# Patient Record
Sex: FEMALE | Race: WHITE | Employment: FULL TIME | URBAN - METROPOLITAN AREA
[De-identification: names, ages, dates, MRNs, and addresses within clinical notes are randomized per-mention and may not be internally consistent; named-entity substitution may affect disease eponyms.]

---

## 2017-01-02 ENCOUNTER — LAB ENCOUNTER (OUTPATIENT)
Dept: LAB | Age: 47
End: 2017-01-02
Attending: INTERNAL MEDICINE

## 2017-01-02 DIAGNOSIS — Z00.00 ROUTINE PHYSICAL EXAMINATION: ICD-10-CM

## 2017-01-02 LAB
ALBUMIN SERPL-MCNC: 3.4 G/DL (ref 3.5–4.8)
ALP LIVER SERPL-CCNC: 56 U/L (ref 37–98)
ALT SERPL-CCNC: 33 U/L (ref 14–54)
AST SERPL-CCNC: 19 U/L (ref 15–41)
BASOPHILS # BLD AUTO: 0.03 X10(3) UL (ref 0–0.1)
BASOPHILS NFR BLD AUTO: 0.4 %
BILIRUB SERPL-MCNC: 0.3 MG/DL (ref 0.1–2)
BUN BLD-MCNC: 13 MG/DL (ref 8–20)
CALCIUM BLD-MCNC: 8.4 MG/DL (ref 8.3–10.3)
CHLORIDE: 108 MMOL/L (ref 101–111)
CHOLEST SMN-MCNC: 179 MG/DL (ref ?–200)
CO2: 27 MMOL/L (ref 22–32)
CREAT BLD-MCNC: 0.77 MG/DL (ref 0.55–1.02)
EOSINOPHIL # BLD AUTO: 0.34 X10(3) UL (ref 0–0.3)
EOSINOPHIL NFR BLD AUTO: 4.8 %
ERYTHROCYTE [DISTWIDTH] IN BLOOD BY AUTOMATED COUNT: 12.5 % (ref 11.5–16)
GLUCOSE BLD-MCNC: 83 MG/DL (ref 70–99)
HCT VFR BLD AUTO: 40.7 % (ref 34–50)
HDLC SERPL-MCNC: 48 MG/DL (ref 45–?)
HDLC SERPL: 3.73 {RATIO} (ref ?–4.44)
HGB BLD-MCNC: 13.3 G/DL (ref 12–16)
IMMATURE GRANULOCYTE COUNT: 0.02 X10(3) UL (ref 0–1)
IMMATURE GRANULOCYTE RATIO %: 0.3 %
LDLC SERPL CALC-MCNC: 110 MG/DL (ref ?–130)
LYMPHOCYTES # BLD AUTO: 2.31 X10(3) UL (ref 0.9–4)
LYMPHOCYTES NFR BLD AUTO: 32.5 %
M PROTEIN MFR SERPL ELPH: 6.7 G/DL (ref 6.1–8.3)
MCH RBC QN AUTO: 29.6 PG (ref 27–33.2)
MCHC RBC AUTO-ENTMCNC: 32.7 G/DL (ref 31–37)
MCV RBC AUTO: 90.4 FL (ref 81–100)
MONOCYTES # BLD AUTO: 0.7 X10(3) UL (ref 0.1–0.6)
MONOCYTES NFR BLD AUTO: 9.8 %
NEUTROPHIL ABS PRELIM: 3.71 X10 (3) UL (ref 1.3–6.7)
NEUTROPHILS # BLD AUTO: 3.71 X10(3) UL (ref 1.3–6.7)
NEUTROPHILS NFR BLD AUTO: 52.2 %
NONHDLC SERPL-MCNC: 131 MG/DL (ref ?–130)
PLATELET # BLD AUTO: 281 10(3)UL (ref 150–450)
POTASSIUM SERPL-SCNC: 4 MMOL/L (ref 3.6–5.1)
RBC # BLD AUTO: 4.5 X10(6)UL (ref 3.8–5.1)
RED CELL DISTRIBUTION WIDTH-SD: 41.6 FL (ref 35.1–46.3)
SODIUM SERPL-SCNC: 141 MMOL/L (ref 136–144)
TRIGLYCERIDES: 105 MG/DL (ref ?–150)
TSI SER-ACNC: 2.99 MIU/ML (ref 0.35–5.5)
VLDL: 21 MG/DL (ref 5–40)
WBC # BLD AUTO: 7.1 X10(3) UL (ref 4–13)

## 2017-01-02 PROCEDURE — 80061 LIPID PANEL: CPT

## 2017-01-02 PROCEDURE — 36415 COLL VENOUS BLD VENIPUNCTURE: CPT

## 2017-01-02 PROCEDURE — 84443 ASSAY THYROID STIM HORMONE: CPT

## 2017-01-02 PROCEDURE — 85025 COMPLETE CBC W/AUTO DIFF WBC: CPT

## 2017-01-02 PROCEDURE — 80053 COMPREHEN METABOLIC PANEL: CPT

## 2017-01-23 ENCOUNTER — OFFICE VISIT (OUTPATIENT)
Dept: INTERNAL MEDICINE CLINIC | Facility: CLINIC | Age: 47
End: 2017-01-23

## 2017-01-23 VITALS
DIASTOLIC BLOOD PRESSURE: 80 MMHG | BODY MASS INDEX: 28 KG/M2 | WEIGHT: 139 LBS | SYSTOLIC BLOOD PRESSURE: 110 MMHG | HEART RATE: 81 BPM | RESPIRATION RATE: 12 BRPM | OXYGEN SATURATION: 100 %

## 2017-01-23 DIAGNOSIS — H10.31 ACUTE CONJUNCTIVITIS OF RIGHT EYE, UNSPECIFIED ACUTE CONJUNCTIVITIS TYPE: ICD-10-CM

## 2017-01-23 DIAGNOSIS — R05.9 COUGH: Primary | ICD-10-CM

## 2017-01-23 PROCEDURE — 99213 OFFICE O/P EST LOW 20 MIN: CPT | Performed by: INTERNAL MEDICINE

## 2017-01-23 RX ORDER — ALBUTEROL SULFATE 90 UG/1
2 AEROSOL, METERED RESPIRATORY (INHALATION) EVERY 6 HOURS PRN
Qty: 1 INHALER | Refills: 0 | Status: SHIPPED | OUTPATIENT
Start: 2017-01-23 | End: 2017-05-03 | Stop reason: ALTCHOICE

## 2017-01-23 RX ORDER — HYDROCODONE BITARTRATE AND HOMATROPINE METHYLBROMIDE ORAL SOLUTION 5; 1.5 MG/5ML; MG/5ML
5 LIQUID ORAL EVERY 6 HOURS PRN
Qty: 240 ML | Refills: 0 | Status: SHIPPED | OUTPATIENT
Start: 2017-01-23 | End: 2017-05-03 | Stop reason: ALTCHOICE

## 2017-01-23 NOTE — PROGRESS NOTES
Trip Schultz is a 42year old female.   HPI:   CC:cold and sinus/sore throat for 1 week  Woke up today with drainage in right eye whitish  Hx allergies  Cough so hard at times    ALL:  Dicloxacillin             Pcns [Penicillins]          Current Outpatie supple  LYMPH: no cervical adenopathy,  CARDIO: AEOR1L4 no S3S4 no murmur,   LUNGS: clear to auscultation,occasional wheeze with forced exhalation      ASSESSMENT AND PLAN:   Cough  (primary encounter diagnosis)-hydromet prn, if not controlled, can use pro

## 2017-05-03 ENCOUNTER — OFFICE VISIT (OUTPATIENT)
Dept: INTERNAL MEDICINE CLINIC | Facility: CLINIC | Age: 47
End: 2017-05-03

## 2017-05-03 VITALS
DIASTOLIC BLOOD PRESSURE: 70 MMHG | HEIGHT: 59 IN | BODY MASS INDEX: 27.62 KG/M2 | HEART RATE: 78 BPM | WEIGHT: 137 LBS | OXYGEN SATURATION: 98 % | TEMPERATURE: 98 F | RESPIRATION RATE: 12 BRPM | SYSTOLIC BLOOD PRESSURE: 90 MMHG

## 2017-05-03 DIAGNOSIS — R21 RASH: Primary | ICD-10-CM

## 2017-05-03 DIAGNOSIS — L30.9 ECZEMA, UNSPECIFIED TYPE: ICD-10-CM

## 2017-05-03 PROCEDURE — 99213 OFFICE O/P EST LOW 20 MIN: CPT | Performed by: INTERNAL MEDICINE

## 2017-05-03 RX ORDER — LEVOFLOXACIN 500 MG/1
500 TABLET, FILM COATED ORAL DAILY
Qty: 7 TABLET | Refills: 0 | Status: SHIPPED | OUTPATIENT
Start: 2017-05-03 | End: 2017-07-07

## 2017-05-03 NOTE — PROGRESS NOTES
Steph Valdez is a 42year old female.   HPI:   CC: dry skin /rash started Thursday  Used essential oils  Itchy+  Hx eczema  Pt uses triamcinolone  No pets at home  Not in garden  No hot tub  Used warm bath oatmeal yesterday  Not at the gym  Stress +  Used dizziness  Musculoskeletal: no joint pain  Extremity: no swelling, no numbness/tingling  Endocrine: no sweating    EXAM:   BP 90/70 mmHg  Pulse 78  Temp(Src) 98.1 °F (36.7 °C) (Oral)  Resp 12  Ht 59\"  Wt 137 lb  BMI 27.66 kg/m2  SpO2 98%  GENERAL: well de

## 2017-07-07 ENCOUNTER — OFFICE VISIT (OUTPATIENT)
Dept: OBGYN CLINIC | Facility: CLINIC | Age: 47
End: 2017-07-07

## 2017-07-07 VITALS
BODY MASS INDEX: 28.43 KG/M2 | HEART RATE: 80 BPM | HEIGHT: 59 IN | DIASTOLIC BLOOD PRESSURE: 62 MMHG | RESPIRATION RATE: 16 BRPM | WEIGHT: 141 LBS | SYSTOLIC BLOOD PRESSURE: 112 MMHG

## 2017-07-07 DIAGNOSIS — Z12.31 VISIT FOR SCREENING MAMMOGRAM: ICD-10-CM

## 2017-07-07 DIAGNOSIS — Z01.419 WELL WOMAN EXAM WITH ROUTINE GYNECOLOGICAL EXAM: Primary | ICD-10-CM

## 2017-07-07 PROBLEM — Z30.41 ENCOUNTER FOR SURVEILLANCE OF CONTRACEPTIVE PILLS: Status: ACTIVE | Noted: 2017-07-07

## 2017-07-07 PROCEDURE — 87624 HPV HI-RISK TYP POOLED RSLT: CPT | Performed by: OBSTETRICS & GYNECOLOGY

## 2017-07-07 PROCEDURE — 99396 PREV VISIT EST AGE 40-64: CPT | Performed by: OBSTETRICS & GYNECOLOGY

## 2017-07-07 PROCEDURE — 88175 CYTOPATH C/V AUTO FLUID REDO: CPT | Performed by: OBSTETRICS & GYNECOLOGY

## 2017-07-07 RX ORDER — NORETHINDRONE ACETATE AND ETHINYL ESTRADIOL AND FERROUS FUMARATE 1MG-20(24)
1 KIT ORAL DAILY
Qty: 28 TABLET | Refills: 0 | Status: SHIPPED | OUTPATIENT
Start: 2017-07-07 | End: 2017-07-07

## 2017-07-07 RX ORDER — NORETHINDRONE ACETATE AND ETHINYL ESTRADIOL AND FERROUS FUMARATE 1MG-20(24)
1 KIT ORAL DAILY
Qty: 28 TABLET | Refills: 11 | Status: SHIPPED | OUTPATIENT
Start: 2017-07-07 | End: 2017-07-16

## 2017-07-07 NOTE — PROGRESS NOTES
Magdaleno Reynolds is a 42year old female  No LMP recorded. Patient is not currently having periods (Reason: Continuous Pill). Patient presents with:  Wellness Visit: annual and refill for [facility]. Oneal Claude    No problems with the birth control pill wishes to stay on Comment: 1-2 drinks per month     Drug use: No    Sexual activity: Not on file     Other Topics Concern    Caffeine Concern Yes    Comment: coffee, tea    Exercise Yes    Comment: 5 days per week      Social History Narrative   None on file       FAMILY HI and situation.  Appropriate mood and affect    Pelvic Exam:  External Genitalia: normal appearance, hair distribution, and no lesions  Urethral Meatus:  normal in size, location, without lesions and prolapse  Bladder:  No fullness, masses or tenderness  Vag

## 2017-07-10 ENCOUNTER — MED REC SCAN ONLY (OUTPATIENT)
Dept: OBGYN CLINIC | Facility: CLINIC | Age: 47
End: 2017-07-10

## 2017-07-10 LAB — HPV I/H RISK 1 DNA SPEC QL NAA+PROBE: NEGATIVE

## 2017-07-10 RX ORDER — SIMVASTATIN 20 MG
TABLET ORAL
Qty: 30 TABLET | Refills: 5 | Status: SHIPPED | OUTPATIENT
Start: 2017-07-10 | End: 2017-12-19

## 2017-07-16 DIAGNOSIS — Z01.419 WELL WOMAN EXAM WITH ROUTINE GYNECOLOGICAL EXAM: ICD-10-CM

## 2017-07-17 RX ORDER — NORETHINDRONE ACETATE AND ETHINYL ESTRADIOL AND FERROUS FUMARATE 1MG-20(24)
KIT ORAL
Qty: 84 TABLET | Refills: 3 | Status: SHIPPED | OUTPATIENT
Start: 2017-07-17 | End: 2018-07-11

## 2017-08-10 ENCOUNTER — OFFICE VISIT (OUTPATIENT)
Dept: FAMILY MEDICINE CLINIC | Facility: CLINIC | Age: 47
End: 2017-08-10

## 2017-08-10 VITALS
SYSTOLIC BLOOD PRESSURE: 116 MMHG | OXYGEN SATURATION: 97 % | TEMPERATURE: 98 F | WEIGHT: 138 LBS | BODY MASS INDEX: 27.82 KG/M2 | HEIGHT: 59 IN | RESPIRATION RATE: 16 BRPM | DIASTOLIC BLOOD PRESSURE: 70 MMHG | HEART RATE: 88 BPM

## 2017-08-10 DIAGNOSIS — L24.7 CONTACT DERMATITIS AND ECZEMA DUE TO PLANT: Primary | ICD-10-CM

## 2017-08-10 PROCEDURE — 99213 OFFICE O/P EST LOW 20 MIN: CPT | Performed by: NURSE PRACTITIONER

## 2017-08-10 RX ORDER — METHYLPREDNISOLONE 4 MG/1
TABLET ORAL
Qty: 1 KIT | Refills: 0 | Status: SHIPPED | OUTPATIENT
Start: 2017-08-10 | End: 2017-11-17

## 2017-08-10 NOTE — PROGRESS NOTES
HPI:    Patient ID: Nehemiah Zarate is a 42year old female. Rash   This is a new problem. The current episode started in the past 7 days. The problem has been gradually worsening since onset.  The affected locations include the left hand and right hand ( Urticarial vesicular rash clustered around dorsum of hand bilaterally from base of thumb extending to wrist. Old patch from eczema noted to left hand. No cellulitis appreciated. Psychiatric: She has a normal mood and affect.               ASSESSMENT/PLAN

## 2017-08-10 NOTE — PATIENT INSTRUCTIONS
Contact Dermatitis  Contact dermatitis is a skin rash caused by something that touches the skin and makes it irritated and inflamed. Your skin may be red, swollen, dry, and may be cracked. Blisters may form and ooze. The rash will itch.   Contact dermatit · You can also try wet dressings. One way to do this is to wear a wet piece of clothing under a dry one. Wear a damp shirt under a dry shirt if your upper body is affected. This can relieve itching and prevent you from scratching the affected area.   · You © 2040-4093 [address], [address] [place]. All rights reserved. This information is not intended as a substitute for professional medical care. Always follow your healthcare professional's instructions.

## 2017-11-17 ENCOUNTER — HOSPITAL ENCOUNTER (OUTPATIENT)
Age: 47
Discharge: HOME | End: 2017-11-17
Attending: FAMILY MEDICINE
Payer: COMMERCIAL

## 2017-11-17 VITALS
BODY MASS INDEX: 28.83 KG/M2 | DIASTOLIC BLOOD PRESSURE: 91 MMHG | RESPIRATION RATE: 18 BRPM | OXYGEN SATURATION: 99 % | TEMPERATURE: 98 F | HEIGHT: 59 IN | HEART RATE: 88 BPM | SYSTOLIC BLOOD PRESSURE: 131 MMHG | WEIGHT: 143 LBS

## 2017-11-17 DIAGNOSIS — K64.9 HEMORRHOIDS, UNSPECIFIED HEMORRHOID TYPE: Primary | ICD-10-CM

## 2017-11-17 PROCEDURE — 99202 OFFICE O/P NEW SF 15 MIN: CPT

## 2017-11-17 PROCEDURE — 82272 OCCULT BLD FECES 1-3 TESTS: CPT

## 2017-11-17 NOTE — ED PROVIDER NOTES
Patient Seen in: [address]    History   Patient presents with:  Bleeding (hematologic)    Stated Complaint: bloody stool    HPI    Pt is a 40 yo who presents with 3 episodes of bright red blood after hard stools.  She no (36.7 °C) (Oral)   Resp 18   Ht 149.9 cm (4' 11\")   Wt 64.9 kg   SpO2 99%   BMI 28.88 kg/m²         Physical Exam   Constitutional: She is oriented to person, place, and time. She appears well-developed and well-nourished. Abdominal: Soft.  Bowel sounds

## 2017-11-17 NOTE — ED INITIAL ASSESSMENT (HPI)
Moderate blood in stool x3, last time was this am; patient denies constipation but states that she had a little difficulty having a BM the first time she noticed blood in the stool on Tuesday; h/o anal fissure in 2002; denies fever, NVD

## 2017-12-19 ENCOUNTER — OFFICE VISIT (OUTPATIENT)
Dept: INTERNAL MEDICINE CLINIC | Facility: CLINIC | Age: 47
End: 2017-12-19

## 2017-12-19 VITALS
WEIGHT: 149 LBS | DIASTOLIC BLOOD PRESSURE: 70 MMHG | RESPIRATION RATE: 16 BRPM | BODY MASS INDEX: 30.04 KG/M2 | HEIGHT: 59 IN | OXYGEN SATURATION: 98 % | SYSTOLIC BLOOD PRESSURE: 110 MMHG | HEART RATE: 97 BPM

## 2017-12-19 DIAGNOSIS — Z00.00 ROUTINE PHYSICAL EXAMINATION: Primary | ICD-10-CM

## 2017-12-19 PROCEDURE — 99396 PREV VISIT EST AGE 40-64: CPT | Performed by: INTERNAL MEDICINE

## 2017-12-19 RX ORDER — SIMVASTATIN 20 MG
TABLET ORAL
Qty: 30 TABLET | Refills: 11 | Status: SHIPPED | OUTPATIENT
Start: 2017-12-19 | End: 2018-11-16

## 2017-12-19 NOTE — PROGRESS NOTES
HPI:   Jaylin Wisdom is a 41year old female who presents for a complete physical exam.  Pt went to urgent care and had hemorrhoid    Wt Readings from Last 6 Encounters:  12/19/17 : 149 lb  11/17/17 : 143 lb  08/10/17 : 138 lb  07/07/17 : 141 lb  05/03/17 negative   • History of biopsy 06/22/2012    vulva biopsy - negative for dysplasia,viral effect, and malignancy   • Human papilloma virus infection 06/29/2015   • Hypercholesterolemia    • Hyperlipidemia    • Multiple allergies    • Pap smear for cervical denies hx of allergy or asthma    EXAM:   /70 (BP Location: Left arm, Patient Position: Sitting, Cuff Size: adult)   Pulse 97   Resp 16   Ht 59\"   Wt 149 lb   SpO2 98%   BMI 30.09 kg/m²   Body mass index is 30.09 kg/m².     GENERAL: well developed, w

## 2018-01-03 ENCOUNTER — TELEPHONE (OUTPATIENT)
Dept: OBGYN CLINIC | Facility: CLINIC | Age: 48
End: 2018-01-03

## 2018-01-03 RX ORDER — NORETHINDRONE ACETATE AND ETHINYL ESTRADIOL AND FERROUS FUMARATE 1MG-20(24)
1 KIT ORAL DAILY
Qty: 28 TABLET | Refills: 6 | Status: SHIPPED | OUTPATIENT
Start: 2018-01-03 | End: 2018-01-31 | Stop reason: WASHOUT

## 2018-01-03 NOTE — TELEPHONE ENCOUNTER
Script for [employer] not being covered by ins d/t new year. Pharm will contact pt to see if ok to give generic.

## 2018-01-25 ENCOUNTER — LAB ENCOUNTER (OUTPATIENT)
Dept: LAB | Age: 48
End: 2018-01-25
Attending: INTERNAL MEDICINE
Payer: COMMERCIAL

## 2018-01-25 DIAGNOSIS — Z00.00 ROUTINE PHYSICAL EXAMINATION: ICD-10-CM

## 2018-01-25 LAB
ALBUMIN SERPL-MCNC: 3.6 G/DL (ref 3.5–4.8)
ALP LIVER SERPL-CCNC: 52 U/L (ref 37–98)
ALT SERPL-CCNC: 39 U/L (ref 14–54)
AST SERPL-CCNC: 19 U/L (ref 15–41)
BASOPHILS # BLD AUTO: 0.02 X10(3) UL (ref 0–0.1)
BASOPHILS NFR BLD AUTO: 0.4 %
BILIRUB SERPL-MCNC: 0.4 MG/DL (ref 0.1–2)
BUN BLD-MCNC: 14 MG/DL (ref 8–20)
CALCIUM BLD-MCNC: 8.5 MG/DL (ref 8.3–10.3)
CHLORIDE: 109 MMOL/L (ref 101–111)
CHOLEST SMN-MCNC: 134 MG/DL (ref ?–200)
CO2: 25 MMOL/L (ref 22–32)
CREAT BLD-MCNC: 0.82 MG/DL (ref 0.55–1.02)
EOSINOPHIL # BLD AUTO: 0.15 X10(3) UL (ref 0–0.3)
EOSINOPHIL NFR BLD AUTO: 3 %
ERYTHROCYTE [DISTWIDTH] IN BLOOD BY AUTOMATED COUNT: 13.1 % (ref 11.5–16)
FREE T4: 1.3 NG/DL (ref 0.9–1.8)
GLUCOSE BLD-MCNC: 86 MG/DL (ref 70–99)
HCT VFR BLD AUTO: 41.3 % (ref 34–50)
HDLC SERPL-MCNC: 48 MG/DL (ref 45–?)
HDLC SERPL: 2.79 {RATIO} (ref ?–4.44)
HGB BLD-MCNC: 13.5 G/DL (ref 12–16)
IMMATURE GRANULOCYTE COUNT: 0.02 X10(3) UL (ref 0–1)
IMMATURE GRANULOCYTE RATIO %: 0.4 %
LDLC SERPL CALC-MCNC: 72 MG/DL (ref ?–130)
LYMPHOCYTES # BLD AUTO: 1.37 X10(3) UL (ref 0.9–4)
LYMPHOCYTES NFR BLD AUTO: 27.5 %
M PROTEIN MFR SERPL ELPH: 7.4 G/DL (ref 6.1–8.3)
MCH RBC QN AUTO: 29.6 PG (ref 27–33.2)
MCHC RBC AUTO-ENTMCNC: 32.7 G/DL (ref 31–37)
MCV RBC AUTO: 90.6 FL (ref 81–100)
MONOCYTES # BLD AUTO: 0.57 X10(3) UL (ref 0.1–0.6)
MONOCYTES NFR BLD AUTO: 11.4 %
NEUTROPHIL ABS PRELIM: 2.86 X10 (3) UL (ref 1.3–6.7)
NEUTROPHILS # BLD AUTO: 2.86 X10(3) UL (ref 1.3–6.7)
NEUTROPHILS NFR BLD AUTO: 57.3 %
NONHDLC SERPL-MCNC: 86 MG/DL (ref ?–130)
PLATELET # BLD AUTO: 217 10(3)UL (ref 150–450)
POTASSIUM SERPL-SCNC: 4.2 MMOL/L (ref 3.6–5.1)
RBC # BLD AUTO: 4.56 X10(6)UL (ref 3.8–5.1)
RED CELL DISTRIBUTION WIDTH-SD: 42.6 FL (ref 35.1–46.3)
SODIUM SERPL-SCNC: 141 MMOL/L (ref 136–144)
TRIGL SERPL-MCNC: 68 MG/DL (ref ?–150)
TSI SER-ACNC: 1.74 MIU/ML (ref 0.35–5.5)
VLDLC SERPL CALC-MCNC: 14 MG/DL (ref 5–40)
WBC # BLD AUTO: 5 X10(3) UL (ref 4–13)

## 2018-01-25 PROCEDURE — 80061 LIPID PANEL: CPT | Performed by: INTERNAL MEDICINE

## 2018-01-25 PROCEDURE — 80050 GENERAL HEALTH PANEL: CPT | Performed by: INTERNAL MEDICINE

## 2018-01-25 PROCEDURE — 36415 COLL VENOUS BLD VENIPUNCTURE: CPT | Performed by: INTERNAL MEDICINE

## 2018-01-25 PROCEDURE — 84439 ASSAY OF FREE THYROXINE: CPT | Performed by: INTERNAL MEDICINE

## 2018-02-03 ENCOUNTER — TELEPHONE (OUTPATIENT)
Dept: INTERNAL MEDICINE CLINIC | Facility: CLINIC | Age: 48
End: 2018-02-03

## 2018-02-03 NOTE — TELEPHONE ENCOUNTER
Incoming (mail or fax):  fax  Received from:  [facility]  Documentation given to:  triage

## 2018-02-05 ENCOUNTER — TELEPHONE (OUTPATIENT)
Dept: INTERNAL MEDICINE CLINIC | Facility: CLINIC | Age: 48
End: 2018-02-05

## 2018-02-05 NOTE — TELEPHONE ENCOUNTER
Received fax from [facility] re: Right breast mammogram Additional Views and US done 2/2/18.   Impression: the questioned asymmetry in Right breast is thought to represent superimposition of normal tissue; recommend screening 1 year, as long as clinical exam mathew

## 2018-02-05 NOTE — TELEPHONE ENCOUNTER
Received fax from [facility] re: 2D/3D mammogram done 1/27/18. Impression: Irregular asymmetry in Right breast is indeterminate. Additional views with possible US recommended. See imaging 2/1/18 Final.    To consult folder.

## 2018-02-05 NOTE — TELEPHONE ENCOUNTER
Incoming (mail or fax):  fax  Received from:  [facility]  Documentation given to:  triage

## 2018-02-12 ENCOUNTER — MED REC SCAN ONLY (OUTPATIENT)
Dept: OBGYN CLINIC | Facility: CLINIC | Age: 48
End: 2018-02-12

## 2018-06-11 RX ORDER — TRIAMCINOLONE ACETONIDE 1 MG/G
CREAM TOPICAL 2 TIMES DAILY PRN
Qty: 45 G | Refills: 0 | Status: SHIPPED | OUTPATIENT
Start: 2018-06-11 | End: 2018-11-16

## 2018-06-11 NOTE — TELEPHONE ENCOUNTER
Last OV relevant to medication: 12/19/17  Last refill date: 12/19/17     #/refills: 45g/0  When pt was asked to return for OV: 1 yr  Upcoming appt/reason: none

## 2018-07-05 ENCOUNTER — TELEPHONE (OUTPATIENT)
Dept: OBGYN CLINIC | Facility: CLINIC | Age: 48
End: 2018-07-05

## 2018-07-05 NOTE — TELEPHONE ENCOUNTER
Per [facility] pt is active with a [facility] ppo plus plan medical coverage with an effective date of 07-. The group # is [id number] and id # [id number]. The reference # for this call is [id number].  Thanks

## 2018-07-11 ENCOUNTER — OFFICE VISIT (OUTPATIENT)
Dept: OBGYN CLINIC | Facility: CLINIC | Age: 48
End: 2018-07-11

## 2018-07-11 VITALS
HEIGHT: 59 IN | SYSTOLIC BLOOD PRESSURE: 104 MMHG | HEART RATE: 64 BPM | BODY MASS INDEX: 28.22 KG/M2 | DIASTOLIC BLOOD PRESSURE: 60 MMHG | WEIGHT: 140 LBS | RESPIRATION RATE: 16 BRPM

## 2018-07-11 DIAGNOSIS — Z01.419 WELL WOMAN EXAM WITH ROUTINE GYNECOLOGICAL EXAM: Primary | ICD-10-CM

## 2018-07-11 DIAGNOSIS — Z30.41 ENCOUNTER FOR SURVEILLANCE OF CONTRACEPTIVE PILLS: ICD-10-CM

## 2018-07-11 PROBLEM — N39.41 URGENCY INCONTINENCE: Status: ACTIVE | Noted: 2018-07-11

## 2018-07-11 PROCEDURE — 99396 PREV VISIT EST AGE 40-64: CPT | Performed by: OBSTETRICS & GYNECOLOGY

## 2018-07-11 RX ORDER — NORETHINDRONE ACETATE AND ETHINYL ESTRADIOL AND FERROUS FUMARATE 1MG-20(24)
1 KIT ORAL DAILY
Qty: 28 TABLET | Refills: 11 | Status: SHIPPED | OUTPATIENT
Start: 2018-07-11 | End: 2018-08-08 | Stop reason: WASHOUT

## 2018-07-11 RX ORDER — TOLTERODINE TARTRATE 2 MG/1
2 TABLET, EXTENDED RELEASE ORAL DAILY
Qty: 30 TABLET | Refills: 11 | Status: SHIPPED | OUTPATIENT
Start: 2018-07-11 | End: 2018-12-27 | Stop reason: ALTCHOICE

## 2018-07-11 NOTE — PROGRESS NOTES
Juma Lockhart is a 41year old female  No LMP recorded (approximate). Patient is not currently having periods (Reason: Continuous Pill).  Patient presents with:  Wellness Visit: annual  .     She does not have periods on the birth control pills stay HISTORY:    Social History  Social History   Marital status:   Spouse name: N/A    Years of education: N/A  Number of children: N/A     Occupational History  None on file     Social History Main Topics   Smoking status: Never Smoker    Smokeless tob nodules, no adenopathy  Breast: normal without palpable masses, tenderness, asymmetry, nipple discharge, nipple retraction or skin changes  Abdomen:  soft, nontender, nondistended, no masses  Skin/Hair: no unusual rashes or bruises   Extremities: no edema,

## 2018-08-12 ENCOUNTER — OFFICE VISIT (OUTPATIENT)
Dept: FAMILY MEDICINE CLINIC | Facility: CLINIC | Age: 48
End: 2018-08-12
Payer: COMMERCIAL

## 2018-08-12 VITALS
WEIGHT: 139.38 LBS | SYSTOLIC BLOOD PRESSURE: 112 MMHG | BODY MASS INDEX: 28 KG/M2 | OXYGEN SATURATION: 98 % | DIASTOLIC BLOOD PRESSURE: 68 MMHG | TEMPERATURE: 99 F | HEART RATE: 76 BPM | RESPIRATION RATE: 20 BRPM

## 2018-08-12 DIAGNOSIS — L30.9 ECZEMA OF BOTH HANDS: Primary | ICD-10-CM

## 2018-08-12 PROCEDURE — 99213 OFFICE O/P EST LOW 20 MIN: CPT | Performed by: NURSE PRACTITIONER

## 2018-08-12 RX ORDER — METHYLPREDNISOLONE 4 MG/1
TABLET ORAL
Qty: 1 KIT | Refills: 0 | Status: SHIPPED | OUTPATIENT
Start: 2018-08-12 | End: 2018-12-27 | Stop reason: ALTCHOICE

## 2018-08-12 NOTE — PROGRESS NOTES
CC: Rash. HPI:  This is a rash problem. The current episode started in the past 3 days. The problem has been worsening since onset. The affected locations include bilateral hands and wrists   .  The rash is characterized by pruritic erythematous shon History   Problem Relation Age of Onset   • osteopenia [Other] [OTHER] Mother    • tobacco [Other] [OTHER] Brother    • cabg [Other] [OTHER] Brother    • Heart Attack Father 45   • tobacco [Other] [OTHER] Father    • Heart Disease Father    • Heart Disorde 57year old female who presents with:    Eczema of both hands  (primary encounter diagnosis)     Will trial Medrol Dose Pack. She is to f/u with Dr. Brandie Estrada or see dermatology.     Meds & Refills for this Visit:  Signed Prescriptions Disp Refills    methylPREDN

## 2018-08-17 DIAGNOSIS — Z01.419 WELL WOMAN EXAM WITH ROUTINE GYNECOLOGICAL EXAM: ICD-10-CM

## 2018-08-17 RX ORDER — NORETHINDRONE ACETATE AND ETHINYL ESTRADIOL AND FERROUS FUMARATE 1MG-20(24)
KIT ORAL
Qty: 84 TABLET | Refills: 3 | Status: SHIPPED | OUTPATIENT
Start: 2018-08-17 | End: 2019-07-15

## 2018-11-12 NOTE — TELEPHONE ENCOUNTER
Left messages for Patient to call back to schedule PE appt with Dr. Sumit Multani to receive Refill or if patient has moved on to New PCP to inform the office as well.     Last OV relevant to medication: 12/19/2017 - PE  Last refill date: 6/11/18    #/refills: 4

## 2018-11-14 NOTE — TELEPHONE ENCOUNTER
[facility] 8:28 AM               Patient scheduled a physical with Dr Tami Cuadra for 12/26/18.                Brad Iniguezigned 11/12/2018               Left messages for Patient to call back to schedule PE appt with Dr. Tami Cuadra to

## 2018-11-16 ENCOUNTER — PATIENT MESSAGE (OUTPATIENT)
Dept: INTERNAL MEDICINE CLINIC | Facility: CLINIC | Age: 48
End: 2018-11-16

## 2018-11-16 RX ORDER — SIMVASTATIN 20 MG
TABLET ORAL
Qty: 30 TABLET | Refills: 0 | Status: SHIPPED | OUTPATIENT
Start: 2018-11-16 | End: 2019-01-08

## 2018-11-16 NOTE — TELEPHONE ENCOUNTER
From: Priscilla Bernard  To: Greyson Sagastume MD  Sent: 2018 11:34 AM CST  Subject: Prescription Question    I have an  prescription for TRIAMCINOLONE ACETONIDE 0.1 % External Cream [Pharmacy Med Name: TRIAMCINOLONE 0.1% CREAM]  I requested the ph

## 2018-11-16 NOTE — TELEPHONE ENCOUNTER
Will only refill once. Please, advice to the pt that this is not long term medication and no more refills will be given until I see and evaluate her.

## 2018-11-16 NOTE — TELEPHONE ENCOUNTER
Pt sent a mychart checking on status of RX. She uses cream for eczema. She did make OV for CPX on 12/26/18. Please advise.

## 2018-12-27 ENCOUNTER — OFFICE VISIT (OUTPATIENT)
Dept: INTERNAL MEDICINE CLINIC | Facility: CLINIC | Age: 48
End: 2018-12-27
Payer: COMMERCIAL

## 2018-12-27 VITALS
RESPIRATION RATE: 14 BRPM | WEIGHT: 152.19 LBS | SYSTOLIC BLOOD PRESSURE: 110 MMHG | TEMPERATURE: 98 F | BODY MASS INDEX: 30.68 KG/M2 | DIASTOLIC BLOOD PRESSURE: 78 MMHG | OXYGEN SATURATION: 98 % | HEIGHT: 59 IN | HEART RATE: 90 BPM

## 2018-12-27 DIAGNOSIS — Z13.89 SCREENING FOR GENITOURINARY CONDITION: ICD-10-CM

## 2018-12-27 DIAGNOSIS — Z13.228 SCREENING FOR METABOLIC DISORDER: ICD-10-CM

## 2018-12-27 DIAGNOSIS — Z13.220 SCREENING FOR LIPOID DISORDERS: ICD-10-CM

## 2018-12-27 DIAGNOSIS — Z00.00 ENCOUNTER FOR ANNUAL PHYSICAL EXAM: Primary | ICD-10-CM

## 2018-12-27 DIAGNOSIS — Z13.0 SCREENING FOR IRON DEFICIENCY ANEMIA: ICD-10-CM

## 2018-12-27 DIAGNOSIS — E55.9 VITAMIN D DEFICIENCY: ICD-10-CM

## 2018-12-27 DIAGNOSIS — Z13.1 SCREENING FOR DIABETES MELLITUS: ICD-10-CM

## 2018-12-27 DIAGNOSIS — Z13.29 SCREENING FOR THYROID DISORDER: ICD-10-CM

## 2018-12-27 PROBLEM — Z01.419 WELL WOMAN EXAM WITH ROUTINE GYNECOLOGICAL EXAM: Status: RESOLVED | Noted: 2017-07-07 | Resolved: 2018-12-27

## 2018-12-27 PROBLEM — Z30.41 ENCOUNTER FOR SURVEILLANCE OF CONTRACEPTIVE PILLS: Status: RESOLVED | Noted: 2017-07-07 | Resolved: 2018-12-27

## 2018-12-27 PROCEDURE — 99396 PREV VISIT EST AGE 40-64: CPT | Performed by: STUDENT IN AN ORGANIZED HEALTH CARE EDUCATION/TRAINING PROGRAM

## 2018-12-27 RX ORDER — SIMVASTATIN 20 MG
TABLET ORAL
Qty: 90 TABLET | Refills: 0 | Status: CANCELLED | OUTPATIENT
Start: 2018-12-27

## 2018-12-27 NOTE — PROGRESS NOTES
[facility]    REASON FOR VISIT:    Radha Arthur is a 57year old female who presents for an [address]. This is a new patient to me.    Patient's medications, allergies, past medical, surgical, social and family histories were rev 30.74 kg/m².       Preventive Services for Which Recommendations Vary with Risk Recommendation Internal Lab or Procedure External Lab or Procedure   Cholesterol Screening Recommended screening varies with age, risk and gender LDL-CHOLESTEROL (mg/dL (calc)) Hyperlipidemia    • Multiple allergies    • Pap smear for cervical cancer screening 06/15,06/12,05/09,05/08,08/07,08/06,08/05,11/04,07/04,07/03,07/02,06/01,06/00   • Parotitis       Past Surgical History:   Procedure Laterality Date   • LABIAL BIOPSY - JAR °C) (Oral)   Resp 14   Ht 59\"   Wt 152 lb 3.2 oz   SpO2 98%   BMI 30.74 kg/m²      Wt Readings from Last 6 Encounters:  12/27/18 : 152 lb 3.2 oz  08/12/18 : 139 lb 6.4 oz  07/11/18 : 140 lb  12/19/17 : 149 lb  11/17/17 : 143 lb  08/10/17 : 138 lb    Body were discussed with the patient and ordered as follows:    Maru Gong was seen today for physical.    Diagnoses and all orders for this visit:    Encounter for annual physical exam  Body mass index is Body mass index is 30.74 kg/m².   Recommended low fat diet a Medical education done. Educated by: MD   The patient indicates understanding of these issues and agrees to the plan.     Diet counseling perfomed  Exercise counseling perfomed    SUGGESTED VACCINATIONS - Influenza, Pneumococcal, Zoster, Tetanus     Imm

## 2018-12-27 NOTE — PATIENT INSTRUCTIONS
Prevention Guidelines, Women Ages 36 to 52  Screening tests and vaccines are an important part of managing your health. A screening test is done to find possible disorders or diseases in people who don't have any symptoms.  The goal is to find a disease e Depression All women in this age group At routine exams   Gonorrhea Sexually active women at increased risk for infection At routine exams   Hepatitis C Anyone at increased risk; 1 time for those born between [facility] At routine exams   High cholester Meningococcal Women at increased risk for infection–talk with your healthcare provider 1 or more doses   Pneumococcal conjugate vaccine (PCV13) and pneumococcal polysaccharide vaccine (PPSV23) Women at increased risk for infection–talk with your healthcare

## 2018-12-29 ENCOUNTER — LAB ENCOUNTER (OUTPATIENT)
Dept: LAB | Age: 48
End: 2018-12-29
Attending: STUDENT IN AN ORGANIZED HEALTH CARE EDUCATION/TRAINING PROGRAM
Payer: COMMERCIAL

## 2018-12-29 DIAGNOSIS — Z13.0 SCREENING FOR IRON DEFICIENCY ANEMIA: ICD-10-CM

## 2018-12-29 DIAGNOSIS — Z13.220 SCREENING FOR LIPOID DISORDERS: ICD-10-CM

## 2018-12-29 DIAGNOSIS — Z13.89 SCREENING FOR GENITOURINARY CONDITION: ICD-10-CM

## 2018-12-29 DIAGNOSIS — Z13.29 SCREENING FOR THYROID DISORDER: ICD-10-CM

## 2018-12-29 DIAGNOSIS — Z13.228 SCREENING FOR METABOLIC DISORDER: ICD-10-CM

## 2018-12-29 DIAGNOSIS — E55.9 VITAMIN D DEFICIENCY: ICD-10-CM

## 2018-12-29 DIAGNOSIS — Z13.1 SCREENING FOR DIABETES MELLITUS: ICD-10-CM

## 2018-12-29 PROCEDURE — 80061 LIPID PANEL: CPT | Performed by: STUDENT IN AN ORGANIZED HEALTH CARE EDUCATION/TRAINING PROGRAM

## 2018-12-29 PROCEDURE — 36415 COLL VENOUS BLD VENIPUNCTURE: CPT | Performed by: STUDENT IN AN ORGANIZED HEALTH CARE EDUCATION/TRAINING PROGRAM

## 2018-12-29 PROCEDURE — 83036 HEMOGLOBIN GLYCOSYLATED A1C: CPT | Performed by: STUDENT IN AN ORGANIZED HEALTH CARE EDUCATION/TRAINING PROGRAM

## 2018-12-29 PROCEDURE — 82306 VITAMIN D 25 HYDROXY: CPT | Performed by: STUDENT IN AN ORGANIZED HEALTH CARE EDUCATION/TRAINING PROGRAM

## 2018-12-29 PROCEDURE — 81003 URINALYSIS AUTO W/O SCOPE: CPT | Performed by: STUDENT IN AN ORGANIZED HEALTH CARE EDUCATION/TRAINING PROGRAM

## 2018-12-29 PROCEDURE — 80050 GENERAL HEALTH PANEL: CPT | Performed by: STUDENT IN AN ORGANIZED HEALTH CARE EDUCATION/TRAINING PROGRAM

## 2019-01-04 ENCOUNTER — MED REC SCAN ONLY (OUTPATIENT)
Dept: INTERNAL MEDICINE CLINIC | Facility: CLINIC | Age: 49
End: 2019-01-04

## 2019-01-05 ENCOUNTER — PATIENT MESSAGE (OUTPATIENT)
Dept: INTERNAL MEDICINE CLINIC | Facility: CLINIC | Age: 49
End: 2019-01-05

## 2019-01-08 RX ORDER — SIMVASTATIN 20 MG
TABLET ORAL
Qty: 90 TABLET | Refills: 0 | Status: SHIPPED | OUTPATIENT
Start: 2019-01-08 | End: 2019-04-05

## 2019-01-08 NOTE — TELEPHONE ENCOUNTER
From: Coy Mcintyre  To: Heri Jerome MD  Sent: 1/5/2019 8:55 AM CST  Subject: Test Results Question    I reviewed the test results through My Chart and see the only thing of concern is the elevated LDL, which is indicated to be minimal concern.  The na

## 2019-01-08 NOTE — TELEPHONE ENCOUNTER
Patient is already on moderate intensity statin therapy which she should continue. She only takes it due to her FH, her 10-year risk for CVD is low. Recommend to adhere to low fat diet and continue regular exercise.  Thanks

## 2019-01-08 NOTE — TELEPHONE ENCOUNTER
See pt email. She is on 20 mg of simvastatin and in concerned that her LDL is elevated. SHould she continue therapy? If so, she needs refill. Please advise.

## 2019-02-06 ENCOUNTER — TELEPHONE (OUTPATIENT)
Dept: INTERNAL MEDICINE CLINIC | Facility: CLINIC | Age: 49
End: 2019-02-06

## 2019-02-06 NOTE — TELEPHONE ENCOUNTER
Incoming (mail or fax):  Fax  Received from: [facility]  Documentation given to:  Triage  ([address])

## 2019-02-06 NOTE — TELEPHONE ENCOUNTER
Received results of Mammogram from [facility] for patient. HM updated. Impression: Benign    Routed to Dr. Smuit Multani.

## 2019-02-08 ENCOUNTER — MED REC SCAN ONLY (OUTPATIENT)
Dept: OBGYN CLINIC | Facility: CLINIC | Age: 49
End: 2019-02-08

## 2019-02-14 ENCOUNTER — MED REC SCAN ONLY (OUTPATIENT)
Dept: INTERNAL MEDICINE CLINIC | Facility: CLINIC | Age: 49
End: 2019-02-14

## 2019-04-05 RX ORDER — SIMVASTATIN 20 MG
TABLET ORAL
Qty: 90 TABLET | Refills: 2 | Status: SHIPPED | OUTPATIENT
Start: 2019-04-05 | End: 2019-12-18

## 2019-07-02 NOTE — TELEPHONE ENCOUNTER
See 11/16/18 email, eczema rashes, prn.     Last OV relevant to medication: 12/27/18 annual  Last refill date: 11/16/18    #/refills: 45g/0  When pt was asked to return for OV: 1 year for PE  Upcoming appt/reason: none

## 2019-07-15 ENCOUNTER — OFFICE VISIT (OUTPATIENT)
Dept: OBGYN CLINIC | Facility: CLINIC | Age: 49
End: 2019-07-15
Payer: COMMERCIAL

## 2019-07-15 VITALS
BODY MASS INDEX: 29.84 KG/M2 | WEIGHT: 148 LBS | HEIGHT: 59 IN | SYSTOLIC BLOOD PRESSURE: 110 MMHG | DIASTOLIC BLOOD PRESSURE: 64 MMHG | HEART RATE: 88 BPM

## 2019-07-15 DIAGNOSIS — Z12.4 PAPANICOLAOU SMEAR FOR CERVICAL CANCER SCREENING: ICD-10-CM

## 2019-07-15 DIAGNOSIS — Z01.419 WELL WOMAN EXAM WITH ROUTINE GYNECOLOGICAL EXAM: ICD-10-CM

## 2019-07-15 DIAGNOSIS — Z12.31 ENCOUNTER FOR SCREENING MAMMOGRAM FOR MALIGNANT NEOPLASM OF BREAST: Primary | ICD-10-CM

## 2019-07-15 PROCEDURE — 88175 CYTOPATH C/V AUTO FLUID REDO: CPT | Performed by: OBSTETRICS & GYNECOLOGY

## 2019-07-15 PROCEDURE — 99396 PREV VISIT EST AGE 40-64: CPT | Performed by: OBSTETRICS & GYNECOLOGY

## 2019-07-15 RX ORDER — NORETHINDRONE ACETATE AND ETHINYL ESTRADIOL AND FERROUS FUMARATE 1MG-20(24)
KIT ORAL
Qty: 84 TABLET | Refills: 3 | Status: SHIPPED | OUTPATIENT
Start: 2019-07-15 | End: 2020-07-08

## 2019-07-15 NOTE — PROGRESS NOTES
Brittany Pedroza is a 57year old female  No LMP recorded. (Menstrual status: Continuous Pill). Patient presents with:  Wellness Visit: wants pap  .      Mammogram is due in January no breast cancer in her family does not have periods wishes to stay on on file      Highest education level: Not on file    Occupational History      Not on file    Social Needs      Financial resource strain: Not on file      Food insecurity:        Worry: Not on file        Inability: Not on file      Transportation needs: Disease Father    • Heart Disorder Father         massive heart attack 45   • Other (tobacco) Father    • Diabetes Other    • Heart Disorder Brother         quadruple bipass at 36   • No Known Problems Daughter    • No Known Problems Son    • No Known Prob appearance, hair distribution, and no lesions  Urethral Meatus:  normal in size, location, without lesions and prolapse  Bladder:  No fullness, masses or tenderness  Vagina:  Normal appearance without lesions, no abnormal discharge  Cervix:  Normal without

## 2019-07-18 LAB — LAST PAP RESULT: NORMAL

## 2019-12-18 RX ORDER — SIMVASTATIN 20 MG
TABLET ORAL
Qty: 30 TABLET | Refills: 0 | Status: SHIPPED | OUTPATIENT
Start: 2019-12-18 | End: 2020-01-16

## 2019-12-18 RX ORDER — SIMVASTATIN 20 MG
TABLET ORAL
Qty: 90 TABLET | Refills: 0 | Status: CANCELLED | OUTPATIENT
Start: 2019-12-18

## 2019-12-18 RX ORDER — SIMVASTATIN 20 MG
TABLET ORAL
Qty: 90 TABLET | Refills: 2 | OUTPATIENT
Start: 2019-12-18

## 2019-12-18 NOTE — TELEPHONE ENCOUNTER
Was patient advised to contact pharmacy directly?: No Pt has PE on 1/16/2020 and is concerned that she will be a couple weeks short on her Rx     Is patient out of meds or supply very low?:  1-2 weeks left    Medication Requested: simvastatin 20 MG Oral

## 2020-01-14 DIAGNOSIS — E78.00 HYPERCHOLESTEROLEMIA: Primary | ICD-10-CM

## 2020-01-15 NOTE — TELEPHONE ENCOUNTER
Last OV relevant to medication: 12/27/18  Last refill date: 12/18/19     #/refills: 30/0  When pt was asked to return for OV: 1 year  Upcoming appt/reason: 1/16/2020, PE    Should have enough until OV. Last CMP & FLP over 1 year ago.   Can address refill a

## 2020-01-16 ENCOUNTER — OFFICE VISIT (OUTPATIENT)
Dept: INTERNAL MEDICINE CLINIC | Facility: CLINIC | Age: 50
End: 2020-01-16
Payer: COMMERCIAL

## 2020-01-16 VITALS
HEIGHT: 59 IN | WEIGHT: 157.19 LBS | BODY MASS INDEX: 31.69 KG/M2 | DIASTOLIC BLOOD PRESSURE: 80 MMHG | RESPIRATION RATE: 14 BRPM | TEMPERATURE: 98 F | SYSTOLIC BLOOD PRESSURE: 122 MMHG | HEART RATE: 78 BPM | OXYGEN SATURATION: 99 %

## 2020-01-16 DIAGNOSIS — Z13.0 SCREENING FOR IRON DEFICIENCY ANEMIA: ICD-10-CM

## 2020-01-16 DIAGNOSIS — Z13.228 SCREENING FOR METABOLIC DISORDER: ICD-10-CM

## 2020-01-16 DIAGNOSIS — Z13.29 SCREENING FOR THYROID DISORDER: ICD-10-CM

## 2020-01-16 DIAGNOSIS — E78.00 HYPERCHOLESTEROLEMIA: ICD-10-CM

## 2020-01-16 DIAGNOSIS — Z13.1 SCREENING FOR DIABETES MELLITUS: ICD-10-CM

## 2020-01-16 DIAGNOSIS — Z00.00 ENCOUNTER FOR ANNUAL PHYSICAL EXAM: Primary | ICD-10-CM

## 2020-01-16 PROCEDURE — 99396 PREV VISIT EST AGE 40-64: CPT | Performed by: STUDENT IN AN ORGANIZED HEALTH CARE EDUCATION/TRAINING PROGRAM

## 2020-01-16 RX ORDER — SIMVASTATIN 20 MG
TABLET ORAL
Qty: 90 TABLET | Refills: 1 | Status: SHIPPED | OUTPATIENT
Start: 2020-01-16 | End: 2020-07-14

## 2020-01-16 NOTE — PROGRESS NOTES
[facility]    REASON FOR VISIT:    Priscilla Bernard is a 54year old female who presents for an [address]. Current Complaints: feeling well. Reports compliance with the medications, denies any side effects  Vaccinations:  Tdap 2012 if history of high blood pressure or other  risk factors HgbA1C (%)   Date Value   12/29/2018 5.5     Glucose (mg/dL)   Date Value   12/29/2018 87     GLUCOSE (mg/dL)   Date Value   12/29/2015 83         Gonorrhea Screening if high risk No results found fo Surgical History:   Procedure Laterality Date   • LABIAL BIOPSY - JAR(S): 2      normal   • LASER SURGERY OF CERVIX     •   2005, 2008   • OTHER SURGICAL HISTORY  10/2014    oral implant    • WISDOM TEETH REMOVED        Family History   Prob Patient Position: Sitting, Cuff Size: adult)   Pulse 78   Temp 98 °F (36.7 °C) (Oral)   Resp 14   Ht 59\"   Wt 157 lb 3.2 oz (71.3 kg)   SpO2 99%   BMI 31.75 kg/m²      Wt Readings from Last 6 Encounters:  01/16/20 : 157 lb 3.2 oz (71.3 kg)  07/15/19 : 148 Liss Segovia is a 54year old female who presents for an [address].      Encounter for annual physical exam  (primary encounter diagnosis)  Screening for iron deficiency anemia  Screening for diabetes mellitus  Screening for thyroid disorder  Scre fasting labs, PAP, screening mammogram, screening colonoscopy     Patient/Caregiver Education: There are no barriers to learning. Medical education done. Educated by: MD   The patient indicates understanding of these issues and agrees to the plan.     D

## 2020-01-16 NOTE — PATIENT INSTRUCTIONS
Prevention Guidelines, Women Ages 36 to 52  Screening tests and vaccines are an important part of managing your health. A screening test is done to find possible disorders or diseases in people who don't have any symptoms.  The goal is to find a disease Depression All women in this age group At routine exams   Gonorrhea Sexually active women at increased risk for infection At routine exams   Hepatitis C Anyone at increased risk; 1 time for those born between [facility] At routine exams   High cholester Meningococcal Women at increased risk for infection–talk with your healthcare provider 1 or more doses   Pneumococcal conjugate vaccine (PCV13) and pneumococcal polysaccharide vaccine (PPSV23) Women at increased risk for infection–talk with your healthcare

## 2020-01-20 RX ORDER — SIMVASTATIN 20 MG
TABLET ORAL
Qty: 90 TABLET | Refills: 0 | OUTPATIENT
Start: 2020-01-20

## 2020-01-25 ENCOUNTER — LAB ENCOUNTER (OUTPATIENT)
Dept: LAB | Age: 50
End: 2020-01-25
Attending: STUDENT IN AN ORGANIZED HEALTH CARE EDUCATION/TRAINING PROGRAM
Payer: COMMERCIAL

## 2020-01-25 DIAGNOSIS — Z13.228 SCREENING FOR METABOLIC DISORDER: ICD-10-CM

## 2020-01-25 DIAGNOSIS — Z13.0 SCREENING FOR IRON DEFICIENCY ANEMIA: ICD-10-CM

## 2020-01-25 DIAGNOSIS — E78.00 HYPERCHOLESTEROLEMIA: ICD-10-CM

## 2020-01-25 DIAGNOSIS — Z13.29 SCREENING FOR THYROID DISORDER: ICD-10-CM

## 2020-01-25 DIAGNOSIS — Z13.1 SCREENING FOR DIABETES MELLITUS: ICD-10-CM

## 2020-01-25 LAB
ALBUMIN SERPL-MCNC: 3.3 G/DL (ref 3.4–5)
ALBUMIN/GLOB SERPL: 0.9 {RATIO} (ref 1–2)
ALP LIVER SERPL-CCNC: 56 U/L (ref 39–100)
ALT SERPL-CCNC: 23 U/L (ref 13–56)
ANION GAP SERPL CALC-SCNC: 4 MMOL/L (ref 0–18)
AST SERPL-CCNC: 18 U/L (ref 15–37)
BASOPHILS # BLD AUTO: 0.03 X10(3) UL (ref 0–0.2)
BASOPHILS NFR BLD AUTO: 0.5 %
BILIRUB SERPL-MCNC: 0.3 MG/DL (ref 0.1–2)
BUN BLD-MCNC: 15 MG/DL (ref 7–18)
BUN/CREAT SERPL: 17.2 (ref 10–20)
CALCIUM BLD-MCNC: 7.9 MG/DL (ref 8.5–10.1)
CHLORIDE SERPL-SCNC: 111 MMOL/L (ref 98–112)
CHOLEST SMN-MCNC: 161 MG/DL (ref ?–200)
CO2 SERPL-SCNC: 27 MMOL/L (ref 21–32)
CREAT BLD-MCNC: 0.87 MG/DL (ref 0.55–1.02)
DEPRECATED RDW RBC AUTO: 45.7 FL (ref 35.1–46.3)
EOSINOPHIL # BLD AUTO: 0.23 X10(3) UL (ref 0–0.7)
EOSINOPHIL NFR BLD AUTO: 3.7 %
ERYTHROCYTE [DISTWIDTH] IN BLOOD BY AUTOMATED COUNT: 13.8 % (ref 11–15)
EST. AVERAGE GLUCOSE BLD GHB EST-MCNC: 103 MG/DL (ref 68–126)
GLOBULIN PLAS-MCNC: 3.6 G/DL (ref 2.8–4.4)
GLUCOSE BLD-MCNC: 83 MG/DL (ref 70–99)
HBA1C MFR BLD HPLC: 5.2 % (ref ?–5.7)
HCT VFR BLD AUTO: 35.9 % (ref 35–48)
HDLC SERPL-MCNC: 44 MG/DL (ref 40–59)
HGB BLD-MCNC: 11.3 G/DL (ref 12–16)
IMM GRANULOCYTES # BLD AUTO: 0.02 X10(3) UL (ref 0–1)
IMM GRANULOCYTES NFR BLD: 0.3 %
LDLC SERPL CALC-MCNC: 105 MG/DL (ref ?–100)
LYMPHOCYTES # BLD AUTO: 1.5 X10(3) UL (ref 1–4)
LYMPHOCYTES NFR BLD AUTO: 24 %
M PROTEIN MFR SERPL ELPH: 6.9 G/DL (ref 6.4–8.2)
MCH RBC QN AUTO: 28.4 PG (ref 26–34)
MCHC RBC AUTO-ENTMCNC: 31.5 G/DL (ref 31–37)
MCV RBC AUTO: 90.2 FL (ref 80–100)
MONOCYTES # BLD AUTO: 0.81 X10(3) UL (ref 0.1–1)
MONOCYTES NFR BLD AUTO: 13 %
NEUTROPHILS # BLD AUTO: 3.65 X10 (3) UL (ref 1.5–7.7)
NEUTROPHILS # BLD AUTO: 3.65 X10(3) UL (ref 1.5–7.7)
NEUTROPHILS NFR BLD AUTO: 58.5 %
NONHDLC SERPL-MCNC: 117 MG/DL (ref ?–130)
OSMOLALITY SERPL CALC.SUM OF ELEC: 294 MOSM/KG (ref 275–295)
PATIENT FASTING Y/N/NP: YES
PATIENT FASTING Y/N/NP: YES
PLATELET # BLD AUTO: 250 10(3)UL (ref 150–450)
POTASSIUM SERPL-SCNC: 4.3 MMOL/L (ref 3.5–5.1)
RBC # BLD AUTO: 3.98 X10(6)UL (ref 3.8–5.3)
SODIUM SERPL-SCNC: 142 MMOL/L (ref 136–145)
TRIGL SERPL-MCNC: 62 MG/DL (ref 30–149)
TSI SER-ACNC: 1.37 MIU/ML (ref 0.36–3.74)
VLDLC SERPL CALC-MCNC: 12 MG/DL (ref 0–30)
WBC # BLD AUTO: 6.2 X10(3) UL (ref 4–11)

## 2020-01-25 PROCEDURE — 80061 LIPID PANEL: CPT | Performed by: STUDENT IN AN ORGANIZED HEALTH CARE EDUCATION/TRAINING PROGRAM

## 2020-01-25 PROCEDURE — 80050 GENERAL HEALTH PANEL: CPT | Performed by: STUDENT IN AN ORGANIZED HEALTH CARE EDUCATION/TRAINING PROGRAM

## 2020-01-25 PROCEDURE — 36415 COLL VENOUS BLD VENIPUNCTURE: CPT | Performed by: STUDENT IN AN ORGANIZED HEALTH CARE EDUCATION/TRAINING PROGRAM

## 2020-01-25 PROCEDURE — 83036 HEMOGLOBIN GLYCOSYLATED A1C: CPT | Performed by: STUDENT IN AN ORGANIZED HEALTH CARE EDUCATION/TRAINING PROGRAM

## 2020-01-30 DIAGNOSIS — D64.9 ANEMIA, UNSPECIFIED TYPE: Primary | ICD-10-CM

## 2020-02-01 ENCOUNTER — HOSPITAL ENCOUNTER (OUTPATIENT)
Dept: CT IMAGING | Facility: HOSPITAL | Age: 50
Discharge: HOME | End: 2020-02-01
Attending: STUDENT IN AN ORGANIZED HEALTH CARE EDUCATION/TRAINING PROGRAM

## 2020-02-01 DIAGNOSIS — Z13.6 SCREENING FOR CARDIOVASCULAR CONDITION: ICD-10-CM

## 2020-02-03 ENCOUNTER — OFFICE VISIT (OUTPATIENT)
Dept: INTERNAL MEDICINE CLINIC | Facility: CLINIC | Age: 50
End: 2020-02-03
Payer: COMMERCIAL

## 2020-02-03 VITALS
RESPIRATION RATE: 14 BRPM | BODY MASS INDEX: 30.77 KG/M2 | WEIGHT: 152.63 LBS | HEIGHT: 59 IN | OXYGEN SATURATION: 97 % | HEART RATE: 105 BPM | SYSTOLIC BLOOD PRESSURE: 104 MMHG | TEMPERATURE: 100 F | DIASTOLIC BLOOD PRESSURE: 60 MMHG

## 2020-02-03 DIAGNOSIS — B34.9 ACUTE VIRAL SYNDROME: Primary | ICD-10-CM

## 2020-02-03 DIAGNOSIS — R91.1 PULMONARY NODULE: ICD-10-CM

## 2020-02-03 PROCEDURE — 99214 OFFICE O/P EST MOD 30 MIN: CPT | Performed by: STUDENT IN AN ORGANIZED HEALTH CARE EDUCATION/TRAINING PROGRAM

## 2020-02-03 NOTE — PROGRESS NOTES
HPI:    Patient ID: Nehemiah Zarate is a 54year old female. Fever    This is a new problem. The current episode started today. The problem occurs daily. The maximum temperature noted was 100 to 100.9 F.  The temperature was taken using a tympanic thermom NIGHT 90 tablet 1   • Norethin Ace-Eth Estrad-FE (MINASTRIN 24 FE) 1-20 MG-MCG(24) Oral Chew Tab CHEW AND SWALLOW ONE TABLET BY MOUTH ONE TIME DAILY 84 tablet 3   • TRIAMCINOLONE ACETONIDE 0.1 % External Cream APPLY TO AFFECTED AREA TWICE DAILY AS NEEDED 4 Xofluza 20 mg sample given. Take 2 tablets once. Patient instructed to use Tylenol/Advil to control fever and body aches.   Get rest and drink plenty of fluids    Pulmonary nodule  Incidental findings of 2 pulmonary nodules on CT calcium score and CT ches

## 2020-02-03 NOTE — PATIENT INSTRUCTIONS
Viral Syndrome (Adult)  A viral illness may cause a number of symptoms such as fever. Other symptoms depend on the part of the body that the virus affects.  If it settles in your nose, throat, and lungs, it may cause cough, sore throat, congestion, runny · Your appetite may be poor, so a light diet is fine. Avoid dehydration by drinking 8 to 12, 8-ounce glasses of fluids each day.  This may include water; orange juice; lemonade; apple, grape, and cranberry juice; clear fruit drinks; electrolyte replacement © 1115-9962 [address]. [address] [facility] [place]. All rights reserved. This information is not intended as a substitute for professional medical care. Always follow your healthcare professional's instructions.

## 2020-02-08 ENCOUNTER — HOSPITAL ENCOUNTER (OUTPATIENT)
Dept: CT IMAGING | Facility: HOSPITAL | Age: 50
Discharge: HOME | End: 2020-02-08
Attending: STUDENT IN AN ORGANIZED HEALTH CARE EDUCATION/TRAINING PROGRAM
Payer: COMMERCIAL

## 2020-02-08 DIAGNOSIS — R91.1 PULMONARY NODULE: ICD-10-CM

## 2020-02-08 PROCEDURE — 71250 CT THORAX DX C-: CPT | Performed by: STUDENT IN AN ORGANIZED HEALTH CARE EDUCATION/TRAINING PROGRAM

## 2020-02-17 ENCOUNTER — TELEPHONE (OUTPATIENT)
Dept: OBGYN CLINIC | Facility: CLINIC | Age: 50
End: 2020-02-17

## 2020-02-17 DIAGNOSIS — R92.2 DENSE BREASTS: ICD-10-CM

## 2020-02-17 DIAGNOSIS — Z12.39 OTHER SCREENING BREAST EXAMINATION: Primary | ICD-10-CM

## 2020-02-17 NOTE — TELEPHONE ENCOUNTER
Pt had a mammo. per pt she was told she need an ultra sound of  the breast .please call pt once order is in.

## 2020-02-17 NOTE — TELEPHONE ENCOUNTER
Spoke with patient and notified her that the breast ultrasound order was placed with original mammogram order on 7/19. Patient verbalized understanding.

## 2020-03-04 ENCOUNTER — MED REC SCAN ONLY (OUTPATIENT)
Dept: OBGYN CLINIC | Facility: CLINIC | Age: 50
End: 2020-03-04

## 2020-03-10 ENCOUNTER — HOSPITAL ENCOUNTER (OUTPATIENT)
Dept: CARDIOLOGY CLINIC | Facility: HOSPITAL | Age: 50
Discharge: HOME | End: 2020-03-10
Attending: STUDENT IN AN ORGANIZED HEALTH CARE EDUCATION/TRAINING PROGRAM

## 2020-03-10 DIAGNOSIS — Z13.9 ENCOUNTER FOR SCREENING: ICD-10-CM

## 2020-03-12 ENCOUNTER — TELEPHONE (OUTPATIENT)
Dept: INTERNAL MEDICINE CLINIC | Facility: CLINIC | Age: 50
End: 2020-03-12

## 2020-03-13 ENCOUNTER — TELEPHONE (OUTPATIENT)
Dept: INTERNAL MEDICINE CLINIC | Facility: CLINIC | Age: 50
End: 2020-03-13

## 2020-03-13 NOTE — TELEPHONE ENCOUNTER
Pt returning a nurse call for results on her vascular screening  Please call back on mobile number  Thank you

## 2020-07-08 DIAGNOSIS — Z01.419 WELL WOMAN EXAM WITH ROUTINE GYNECOLOGICAL EXAM: ICD-10-CM

## 2020-07-08 RX ORDER — NORETHINDRONE ACETATE AND ETHINYL ESTRADIOL AND FERROUS FUMARATE 1MG-20(24)
KIT ORAL
Qty: 84 TABLET | Refills: 0 | Status: SHIPPED | OUTPATIENT
Start: 2020-07-08 | End: 2020-07-21

## 2020-07-13 DIAGNOSIS — E78.00 HYPERCHOLESTEROLEMIA: ICD-10-CM

## 2020-07-14 RX ORDER — SIMVASTATIN 20 MG
TABLET ORAL
Qty: 90 TABLET | Refills: 1 | Status: SHIPPED | OUTPATIENT
Start: 2020-07-14 | End: 2021-01-05

## 2020-07-21 ENCOUNTER — OFFICE VISIT (OUTPATIENT)
Dept: OBGYN CLINIC | Facility: CLINIC | Age: 50
End: 2020-07-21
Payer: COMMERCIAL

## 2020-07-21 VITALS
WEIGHT: 149 LBS | DIASTOLIC BLOOD PRESSURE: 58 MMHG | HEIGHT: 59 IN | BODY MASS INDEX: 30.04 KG/M2 | SYSTOLIC BLOOD PRESSURE: 118 MMHG | HEART RATE: 85 BPM

## 2020-07-21 DIAGNOSIS — Z12.31 ENCOUNTER FOR SCREENING MAMMOGRAM FOR MALIGNANT NEOPLASM OF BREAST: ICD-10-CM

## 2020-07-21 DIAGNOSIS — Z01.419 WELL WOMAN EXAM WITH ROUTINE GYNECOLOGICAL EXAM: Primary | ICD-10-CM

## 2020-07-21 PROCEDURE — 3008F BODY MASS INDEX DOCD: CPT | Performed by: OBSTETRICS & GYNECOLOGY

## 2020-07-21 PROCEDURE — 3074F SYST BP LT 130 MM HG: CPT | Performed by: OBSTETRICS & GYNECOLOGY

## 2020-07-21 PROCEDURE — 99396 PREV VISIT EST AGE 40-64: CPT | Performed by: OBSTETRICS & GYNECOLOGY

## 2020-07-21 PROCEDURE — 3078F DIAST BP <80 MM HG: CPT | Performed by: OBSTETRICS & GYNECOLOGY

## 2020-07-21 RX ORDER — NORETHINDRONE ACETATE AND ETHINYL ESTRADIOL 1MG-20(21)
1 KIT ORAL DAILY
Qty: 3 PACKAGE | Refills: 4 | Status: SHIPPED | OUTPATIENT
Start: 2020-07-21 | End: 2021-06-15

## 2020-07-21 NOTE — PROGRESS NOTES
Trip Schultz is a 54year old female  No LMP recorded. (Menstrual status: Continuous Pill).  Patient presents with:  Wellness Visit  .     c/o of gaining 5 pounds for the last couple years she changed to low-carb high healthy fat diet and has lost • Ewing teeth removed         SOCIAL HISTORY:  Social History    Socioeconomic History      Marital status:       Spouse name: Not on file      Number of children: Not on file      Years of education: Not on file      Highest education level: No Narrative      Not on file      FAMILY HISTORY:  Family History   Problem Relation Age of Onset   • Other (osteopenia) Mother    • Other (tobacco) Brother    • Other (cabg) Brother    • Heart Attack Father 45   • Heart Disease Father    • Heart Disorder Fa soft, nontender, nondistended, no masses  Skin/Hair: no unusual rashes or bruises   Extremities: no edema, no cyanosis  Psychiatric:  Oriented to time, place, person and situation.  Appropriate mood and affect    Pelvic Exam:  External Genitalia: normal cale

## 2020-09-23 DIAGNOSIS — Z01.419 WELL WOMAN EXAM WITH ROUTINE GYNECOLOGICAL EXAM: ICD-10-CM

## 2020-09-24 ENCOUNTER — TELEPHONE (OUTPATIENT)
Dept: INTERNAL MEDICINE CLINIC | Facility: CLINIC | Age: 50
End: 2020-09-24

## 2020-09-24 ENCOUNTER — TELEMEDICINE (OUTPATIENT)
Dept: INTERNAL MEDICINE CLINIC | Facility: CLINIC | Age: 50
End: 2020-09-24

## 2020-09-24 ENCOUNTER — APPOINTMENT (OUTPATIENT)
Dept: LAB | Facility: HOSPITAL | Age: 50
End: 2020-09-24
Attending: NURSE PRACTITIONER
Payer: COMMERCIAL

## 2020-09-24 DIAGNOSIS — R11.0 NAUSEA: ICD-10-CM

## 2020-09-24 DIAGNOSIS — R19.7 DIARRHEA OF PRESUMED INFECTIOUS ORIGIN: Primary | ICD-10-CM

## 2020-09-24 DIAGNOSIS — R19.7 DIARRHEA OF PRESUMED INFECTIOUS ORIGIN: ICD-10-CM

## 2020-09-24 PROCEDURE — 99213 OFFICE O/P EST LOW 20 MIN: CPT | Performed by: NURSE PRACTITIONER

## 2020-09-24 RX ORDER — NORETHINDRONE ACETATE AND ETHINYL ESTRADIOL AND FERROUS FUMARATE 1MG-20(24)
KIT ORAL
Qty: 84 TABLET | Refills: 2 | Status: SHIPPED | OUTPATIENT
Start: 2020-09-24 | End: 2021-01-27

## 2020-09-24 NOTE — TELEPHONE ENCOUNTER
Patient has had nausea and diarrhea. She is worried she may have COVID. Virtual/Telephone Consent    Magdaleno Reynolds verbally consents to/declines a Virtual/Telephone Check-In service on 9/24/20.   Patient understands and accepts financial responsibility

## 2020-09-24 NOTE — PATIENT INSTRUCTIONS
Hydrate well    Eat a brat diet    Monitor for other symptoms. Quarantine at home separate from your family as a precaution just in case until you get your test results.     Infection prevention for COVID  - proper hand hygiene is important

## 2020-09-24 NOTE — PROGRESS NOTES
Virtual video Gabirel verbally consents to a Virtual/video Check-In visit on 09/24/20. Patient has been referred to the [facility] website at [email or web address] to review the yearly Consent to Treat document.     Patient understands an Diagnosis Date   • Allergic rhinitis    • Ankle sprain 05/2006   • Arthritis mentioned by Dr Eileen Mason last year.     Wondering how I can handle discomfort and weakness   • Bilateral breast cysts 02/26/2020    Ultrasound of both breast negative for malignancy TP 6.9 01/25/2020    ALB 3.3 (L) 01/25/2020    GLOBULT 2.8 12/29/2015    GLOBULIN 3.6 01/25/2020    ALBGLOBRAT 1.4 12/29/2015     01/25/2020    K 4.3 01/25/2020     01/25/2020    CO2 27.0 01/25/2020      Lab Results   Component Value Date    [facility] having respiratory symptoms of cough, congestion, fever, or SOB. -Please utilize the [employer] website or coronavirus. gov for the most accurate and up-to-date information on COVID-19.  - Recommended self-isolation at home, wearing a mask if in room with other f

## 2020-09-26 LAB — SARS-COV-2 RNA RESP QL NAA+PROBE: NOT DETECTED

## 2020-10-12 ENCOUNTER — LAB ENCOUNTER (OUTPATIENT)
Dept: LAB | Age: 50
End: 2020-10-12
Attending: STUDENT IN AN ORGANIZED HEALTH CARE EDUCATION/TRAINING PROGRAM
Payer: COMMERCIAL

## 2020-10-12 DIAGNOSIS — D64.9 ANEMIA, UNSPECIFIED TYPE: ICD-10-CM

## 2020-10-12 PROCEDURE — 36415 COLL VENOUS BLD VENIPUNCTURE: CPT | Performed by: STUDENT IN AN ORGANIZED HEALTH CARE EDUCATION/TRAINING PROGRAM

## 2020-10-12 PROCEDURE — 85025 COMPLETE CBC W/AUTO DIFF WBC: CPT | Performed by: STUDENT IN AN ORGANIZED HEALTH CARE EDUCATION/TRAINING PROGRAM

## 2020-11-02 LAB
AMB EXT CHOL/HDL RATIO: 3.3
AMB EXT CHOLESTEROL, TOTAL: 182 MG/DL
AMB EXT CREATININE: 0.77 MG/DL
AMB EXT GFR: 92
AMB EXT GLUCOSE: 97 MG/DL
AMB EXT HDL CHOLESTEROL: 55 MG/DL
AMB EXT HEMATOCRIT: 37.4
AMB EXT HEMOGLOBIN: 12.2
AMB EXT LDL CHOLESTEROL, DIRECT: 113 MG/DL
AMB EXT MCV: 90
AMB EXT NON HDL CHOL: 127 MG/DL
AMB EXT PLATELETS: 305
AMB EXT TRIGLYCERIDES: 74 MG/DL
AMB EXT WBC: 5.8 X10(3)UL

## 2021-01-05 DIAGNOSIS — E78.00 HYPERCHOLESTEROLEMIA: ICD-10-CM

## 2021-01-05 RX ORDER — SIMVASTATIN 20 MG
TABLET ORAL
Qty: 90 TABLET | Refills: 0 | Status: SHIPPED | OUTPATIENT
Start: 2021-01-05 | End: 2021-04-09

## 2021-01-05 NOTE — TELEPHONE ENCOUNTER
Last refill #90 x 1 on 7/114/2020  Last office visit pertaining to refill on 1/16/2020  The patient was asked to return in one year for annual exam  Reminder letter sent to patient notifying her she is due for labs and PE.       Future Appointments   Date T

## 2021-01-26 ENCOUNTER — TELEPHONE (OUTPATIENT)
Dept: INTERNAL MEDICINE CLINIC | Facility: CLINIC | Age: 51
End: 2021-01-26

## 2021-01-26 NOTE — TELEPHONE ENCOUNTER
Incoming (mail or fax): fax  Received from: patient  Documentation given to: Dr. Kendra Carrera    Received copy of pt's employer wellness screening results.   Updated Ext Result Console

## 2021-01-27 ENCOUNTER — OFFICE VISIT (OUTPATIENT)
Dept: INTERNAL MEDICINE CLINIC | Facility: CLINIC | Age: 51
End: 2021-01-27
Payer: COMMERCIAL

## 2021-01-27 VITALS
HEIGHT: 59 IN | OXYGEN SATURATION: 99 % | WEIGHT: 140.19 LBS | RESPIRATION RATE: 16 BRPM | BODY MASS INDEX: 28.26 KG/M2 | HEART RATE: 73 BPM | SYSTOLIC BLOOD PRESSURE: 122 MMHG | DIASTOLIC BLOOD PRESSURE: 78 MMHG | TEMPERATURE: 97 F

## 2021-01-27 DIAGNOSIS — Z13.29 SCREENING FOR THYROID DISORDER: ICD-10-CM

## 2021-01-27 DIAGNOSIS — Z00.00 ENCOUNTER FOR ANNUAL PHYSICAL EXAM: Primary | ICD-10-CM

## 2021-01-27 PROCEDURE — 3078F DIAST BP <80 MM HG: CPT | Performed by: STUDENT IN AN ORGANIZED HEALTH CARE EDUCATION/TRAINING PROGRAM

## 2021-01-27 PROCEDURE — 3074F SYST BP LT 130 MM HG: CPT | Performed by: STUDENT IN AN ORGANIZED HEALTH CARE EDUCATION/TRAINING PROGRAM

## 2021-01-27 PROCEDURE — 3008F BODY MASS INDEX DOCD: CPT | Performed by: STUDENT IN AN ORGANIZED HEALTH CARE EDUCATION/TRAINING PROGRAM

## 2021-01-27 PROCEDURE — 99396 PREV VISIT EST AGE 40-64: CPT | Performed by: STUDENT IN AN ORGANIZED HEALTH CARE EDUCATION/TRAINING PROGRAM

## 2021-01-27 NOTE — PATIENT INSTRUCTIONS
Prevention Guidelines, Women Ages 36 to 52  Screening tests and vaccines are an important part of managing your health. A screening test is done to find diseases in people who don't have any symptoms.  The goal is to find a disease early so lifestyle cao sigmoidoscopy every 5 years, or  · Colonoscopy every 10 years, or  · CT colonography (virtual colonoscopy) every 5 years, or  · Yearly fecal occult blood test, or  · Yearly fecal immunochemical test every year, or  · Stool DNA test, every 3 years  If you c least 4 weeks after the first dose   Hepatitis A Women at increased risk for infection–talk with your healthcare provider 2 doses given 6 months apart   Hepatitis B Women at increased risk for infection–talk with your healthcare provider 3 doses over 6 mon American Academy of Ophthalmology  Adry last reviewed this educational content on 11/1/2017  © 7821-5732 [address]. [address] [facility], [address] [place] [address]. All rights reserved.  This information is not intended as a substitute for pro

## 2021-01-27 NOTE — PROGRESS NOTES
[address] [facility]    REASON FOR VISIT:    Gideon Will is a 52year old female who presents for an [address]. Current Complaints: feeling well. Reports compliance with the medications, denies any side effects  Vaccinations:  Tdap 2012 if history of high blood pressure or other  risk factors HgbA1C (%)   Date Value   01/25/2020 5.2     Glucose (mg/dL)   Date Value   11/02/2020 97     GLUCOSE (mg/dL)   Date Value   12/29/2015 83         Gonorrhea Screening if high risk No results found fo 06/29/2015    pap negative   • Hypercholesterolemia    • Hyperlipidemia    • Multiple allergies    • Pap smear for cervical cancer screening 06/15,06/12,05/09,05/08,08/07,08/06,08/05,11/04,07/04,07/03,07/02,06/01,06/00   • Parotitis       Past Surgical His Neurological: Negative for tremors, weakness and numbness. Hematological: Negative for adenopathy. Does not bruise/bleed easily. Psychiatric/Behavioral: Negative for confusion and agitation. The patient is not nervous/anxious.     EXAM:   /78 (B and oriented to person, place, and time. Cranial nerves are intact,motor and sensory are grossly intact. She has normal reflexes. Skin: Skin is warm. No rash noted. No erythema.    Psychiatric: She has a normal mood and affect and her behavior is normal. learning. Medical education done. Educated by: MD   The patient indicates understanding of these issues and agrees to the plan.     Diet counseling perfomed  Exercise counseling perfomed    SUGGESTED VACCINATIONS - Influenza, Pneumococcal, Zoster, Tetan

## 2021-01-28 ENCOUNTER — MED REC SCAN ONLY (OUTPATIENT)
Dept: INTERNAL MEDICINE CLINIC | Facility: CLINIC | Age: 51
End: 2021-01-28

## 2021-02-25 ENCOUNTER — TELEPHONE (OUTPATIENT)
Dept: OBGYN CLINIC | Facility: CLINIC | Age: 51
End: 2021-02-25

## 2021-02-25 DIAGNOSIS — R92.2 BREAST DENSITY: ICD-10-CM

## 2021-02-25 DIAGNOSIS — Z12.39 ENCOUNTER FOR BREAST CANCER SCREENING OTHER THAN MAMMOGRAM: Primary | ICD-10-CM

## 2021-02-25 NOTE — TELEPHONE ENCOUNTER
Need order for breast ultrasound. cpt [id number]  Dx Z12.39 & R 92. 2. please fax to [facility] @ [phone].

## 2021-03-24 ENCOUNTER — LABORATORY ENCOUNTER (OUTPATIENT)
Dept: LAB | Age: 51
End: 2021-03-24
Attending: STUDENT IN AN ORGANIZED HEALTH CARE EDUCATION/TRAINING PROGRAM
Payer: COMMERCIAL

## 2021-03-24 DIAGNOSIS — Z13.29 SCREENING FOR THYROID DISORDER: ICD-10-CM

## 2021-03-24 LAB — TSI SER-ACNC: 2.34 MIU/ML (ref 0.36–3.74)

## 2021-03-24 PROCEDURE — 36415 COLL VENOUS BLD VENIPUNCTURE: CPT | Performed by: STUDENT IN AN ORGANIZED HEALTH CARE EDUCATION/TRAINING PROGRAM

## 2021-03-24 PROCEDURE — 84443 ASSAY THYROID STIM HORMONE: CPT | Performed by: STUDENT IN AN ORGANIZED HEALTH CARE EDUCATION/TRAINING PROGRAM

## 2021-04-02 NOTE — TELEPHONE ENCOUNTER
Last refill 45g on 8/28/2020  Last office visit pertaining to refill on 1/27/2021 - cpx.   Future Appointments   Date Time Provider Mani Nicole   7/23/2021  9:00 AM Moises العراقي MD [facility]   1/31/2022  4:30 PM Greyson Sagastume MD [facility]

## 2021-04-08 DIAGNOSIS — E78.00 HYPERCHOLESTEROLEMIA: ICD-10-CM

## 2021-04-09 RX ORDER — SIMVASTATIN 20 MG
TABLET ORAL
Qty: 90 TABLET | Refills: 0 | Status: SHIPPED | OUTPATIENT
Start: 2021-04-09 | End: 2021-07-07

## 2021-04-09 NOTE — TELEPHONE ENCOUNTER
Last OV relevant to medication: 1/27/21  Last refill date: 1/5/21     #/refills: 90-0  When pt was asked to return for OV: 1 YR  Upcoming appt/reason: 1/31/22, PE  Was pt informed of any over due labs: no  Lab Results   Component Value Date    CHOLEST 182

## 2021-05-29 DIAGNOSIS — Z01.419 WELL WOMAN EXAM WITH ROUTINE GYNECOLOGICAL EXAM: ICD-10-CM

## 2021-06-01 RX ORDER — NORETHINDRONE ACETATE AND ETHINYL ESTRADIOL AND FERROUS FUMARATE 1MG-20(24)
KIT ORAL
Qty: 84 TABLET | Refills: 2 | OUTPATIENT
Start: 2021-06-01

## 2021-06-14 DIAGNOSIS — Z01.419 WELL WOMAN EXAM WITH ROUTINE GYNECOLOGICAL EXAM: ICD-10-CM

## 2021-06-14 RX ORDER — NORETHINDRONE ACETATE AND ETHINYL ESTRADIOL AND FERROUS FUMARATE 1MG-20(24)
KIT ORAL
Qty: 84 TABLET | Refills: 0 | OUTPATIENT
Start: 2021-06-14

## 2021-06-15 RX ORDER — NORETHINDRONE ACETATE AND ETHINYL ESTRADIOL AND FERROUS FUMARATE 1MG-20(24)
KIT ORAL
Qty: 84 TABLET | Refills: 0 | Status: SHIPPED | OUTPATIENT
Start: 2021-06-15 | End: 2021-08-31

## 2021-06-15 RX ORDER — NORETHINDRONE ACETATE AND ETHINYL ESTRADIOL 1MG-20(21)
1 KIT ORAL DAILY
Qty: 28 TABLET | Refills: 16 | OUTPATIENT
Start: 2021-06-15 | End: 2022-09-08

## 2021-06-15 NOTE — TELEPHONE ENCOUNTER
Clarified prescription with patient. Prefers [facility]. Script sent to preferred pharmacy. Understanding verbalized.

## 2021-07-04 DIAGNOSIS — E78.00 HYPERCHOLESTEROLEMIA: ICD-10-CM

## 2021-07-07 NOTE — TELEPHONE ENCOUNTER
Last OV relevant to medication: 1/27/21  Last refill date: 4/9/21     #/refills: 90/0  When pt was asked to return for OV: 1 year  Upcoming appt/reason: 1/31/22, PE but will have to r/s to other provider  Was pt informed of any over due labs: none    Lab R

## 2021-07-08 RX ORDER — SIMVASTATIN 20 MG
TABLET ORAL
Qty: 90 TABLET | Refills: 1 | Status: SHIPPED | OUTPATIENT
Start: 2021-07-08 | End: 2022-01-07

## 2021-07-23 ENCOUNTER — OFFICE VISIT (OUTPATIENT)
Dept: OBGYN CLINIC | Facility: CLINIC | Age: 51
End: 2021-07-23
Payer: COMMERCIAL

## 2021-07-23 VITALS
DIASTOLIC BLOOD PRESSURE: 62 MMHG | HEART RATE: 92 BPM | SYSTOLIC BLOOD PRESSURE: 118 MMHG | WEIGHT: 143 LBS | BODY MASS INDEX: 28.83 KG/M2 | HEIGHT: 59 IN

## 2021-07-23 DIAGNOSIS — Z01.419 WELL WOMAN EXAM WITH ROUTINE GYNECOLOGICAL EXAM: ICD-10-CM

## 2021-07-23 DIAGNOSIS — Z12.31 ENCOUNTER FOR SCREENING MAMMOGRAM FOR BREAST CANCER: Primary | ICD-10-CM

## 2021-07-23 PROCEDURE — 3008F BODY MASS INDEX DOCD: CPT | Performed by: OBSTETRICS & GYNECOLOGY

## 2021-07-23 PROCEDURE — 99396 PREV VISIT EST AGE 40-64: CPT | Performed by: OBSTETRICS & GYNECOLOGY

## 2021-07-23 PROCEDURE — 3074F SYST BP LT 130 MM HG: CPT | Performed by: OBSTETRICS & GYNECOLOGY

## 2021-07-23 PROCEDURE — 3078F DIAST BP <80 MM HG: CPT | Performed by: OBSTETRICS & GYNECOLOGY

## 2021-07-23 RX ORDER — TOLTERODINE TARTRATE 2 MG/1
2 TABLET, EXTENDED RELEASE ORAL DAILY
Qty: 30 TABLET | Refills: 11 | Status: SHIPPED | OUTPATIENT
Start: 2021-07-23

## 2021-07-23 NOTE — PROGRESS NOTES
Sid Palmer is a 52year old female  No LMP recorded. (Menstrual status: Continuous Pill). Patient presents with:  Wellness Visit  . She complains of some urgency and frequency no nocturia.   Was discussed in the past but she did not try the m Procedure Laterality Date   • Labial biopsy - jar(s): 2      normal   • Laser surgery of cervix     •   2005, 2008   • Oral surgery  2019   • Other surgical history  10/2014    oral implant    • [place] teeth removed         SOCIAL HISTORY: Active Member of Clubs or Organizations:       Attends Club or Organization Meetings:       Marital Status:   Intimate Partner Violence:       Fear of Current or Ex-Partner:       Emotionally Abused:       Physically Abused:       Sexually Abused:     Nicole Mckeon headaches, extremity weakness or numbness.       PHYSICAL EXAM:   Constitutional: well developed, well nourished  Head/Face: normocephalic  Neck/Thyroid: thyroid symmetric, no thyromegaly, no nodules, no adenopathy  Breast: normal without palpable masses, t

## 2021-08-31 DIAGNOSIS — Z01.419 WELL WOMAN EXAM WITH ROUTINE GYNECOLOGICAL EXAM: ICD-10-CM

## 2021-08-31 RX ORDER — NORETHINDRONE ACETATE AND ETHINYL ESTRADIOL AND FERROUS FUMARATE 1MG-20(24)
KIT ORAL
Qty: 84 TABLET | Refills: 0 | Status: SHIPPED | OUTPATIENT
Start: 2021-08-31 | End: 2021-11-29

## 2021-09-09 NOTE — TELEPHONE ENCOUNTER
Last OV relevant to medication: 1/27/2021  Last refill date: 4/2/2021   #/refills: 45g-0  When pt was asked to return for OV: 1 year for physical  Upcoming appt/reason: none scheduled  Was pt informed of any over due labs: n/a

## 2021-09-10 ENCOUNTER — TELEPHONE (OUTPATIENT)
Dept: OBGYN CLINIC | Facility: CLINIC | Age: 51
End: 2021-09-10

## 2021-10-25 LAB
AMB EXT BILIRUBIN, TOTAL: 0.2 MG/DL (ref 0–1.2)
AMB EXT BUN: 13 MG/DL (ref 6–24)
AMB EXT CALCIUM: 9 (ref 8.7–10.2)
AMB EXT CHLORIDE: 107 (ref 96–106)
AMB EXT CHOL/HDL RATIO: 2.8 (ref 0–4.4)
AMB EXT CHOLESTEROL, TOTAL: 172 MG/DL (ref 100–199)
AMB EXT CMP ALT: 11 U/L (ref 0–32)
AMB EXT CMP AST: 15 U/L (ref 0–40)
AMB EXT CREATININE: 0.87 MG/DL (ref 0.57–1)
AMB EXT EGFR NON-AA: 78 (ref 60–150)
AMB EXT GLUCOSE: 83 MG/DL (ref 65–99)
AMB EXT HDL CHOLESTEROL: 62 MG/DL (ref 50–175)
AMB EXT HEMATOCRIT: 37.4 (ref 34–46.6)
AMB EXT HEMOGLOBIN: 12 (ref 11.1–15.9)
AMB EXT LDL CHOLESTEROL, DIRECT: 98 MG/DL (ref 0–99)
AMB EXT MCV: 91 (ref 79–97)
AMB EXT NON HDL CHOL: 110 MG/DL (ref 0–129)
AMB EXT PLATELETS: 282 (ref 150–450)
AMB EXT POSTASSIUM: 4.4 MMOL/L (ref 3.5–5.2)
AMB EXT SODIUM: 141 MMOL/L (ref 134–144)
AMB EXT TOTAL PROTEIN: 6.6 (ref 6–8.5)
AMB EXT TRIGLYCERIDES: 62 MG/DL (ref 0–149)
AMB EXT WBC: 5.6 X10(3)UL (ref 3.4–10.8)

## 2021-11-27 DIAGNOSIS — Z01.419 WELL WOMAN EXAM WITH ROUTINE GYNECOLOGICAL EXAM: ICD-10-CM

## 2021-11-29 RX ORDER — NORETHINDRONE ACETATE AND ETHINYL ESTRADIOL AND FERROUS FUMARATE 1MG-20(24)
KIT ORAL
Qty: 84 TABLET | Refills: 0 | Status: SHIPPED | OUTPATIENT
Start: 2021-11-29

## 2022-01-07 DIAGNOSIS — E78.00 HYPERCHOLESTEROLEMIA: ICD-10-CM

## 2022-01-07 RX ORDER — SIMVASTATIN 20 MG
TABLET ORAL
Qty: 30 TABLET | Refills: 0 | Status: SHIPPED | OUTPATIENT
Start: 2022-01-07 | End: 2022-01-31

## 2022-01-07 NOTE — TELEPHONE ENCOUNTER
Last OV relevant to medication: 1/27/21  Last refill date: 7/8/21     #/refills: 90/1  When pt was asked to return for OV: 1 year  Upcoming appt/reason: 1/31/22, PE  Was pt informed of any over due labs: none    Lab Results   Component Value Date    GLU 97

## 2022-01-24 ENCOUNTER — PATIENT MESSAGE (OUTPATIENT)
Dept: INTERNAL MEDICINE CLINIC | Facility: CLINIC | Age: 52
End: 2022-01-24

## 2022-01-24 NOTE — TELEPHONE ENCOUNTER
From: Delilah Molina  To: ALEJO Wilder  Sent: 1/24/2022 9:18 AM CST  Subject: Wellness screening    Hello, I participated in a wellness screening through work at the end of October.  I printed out the results and can bring a copy to my appointment

## 2022-01-24 NOTE — TELEPHONE ENCOUNTER
See pt's MyChart attachment  Updated Ext Result Console  To Rashmi for review, upcoming OV 1/31/22 to discuss labs

## 2022-01-31 ENCOUNTER — OFFICE VISIT (OUTPATIENT)
Dept: INTERNAL MEDICINE CLINIC | Facility: CLINIC | Age: 52
End: 2022-01-31
Payer: COMMERCIAL

## 2022-01-31 VITALS
WEIGHT: 161.13 LBS | RESPIRATION RATE: 16 BRPM | HEART RATE: 68 BPM | HEIGHT: 59.28 IN | BODY MASS INDEX: 32.06 KG/M2 | TEMPERATURE: 98 F | SYSTOLIC BLOOD PRESSURE: 126 MMHG | OXYGEN SATURATION: 98 % | DIASTOLIC BLOOD PRESSURE: 78 MMHG

## 2022-01-31 DIAGNOSIS — Z13.29 THYROID DISORDER SCREENING: ICD-10-CM

## 2022-01-31 DIAGNOSIS — Z12.83 SKIN CANCER SCREENING: ICD-10-CM

## 2022-01-31 DIAGNOSIS — I25.10 CORONARY ARTERY DISEASE INVOLVING NATIVE CORONARY ARTERY OF NATIVE HEART WITHOUT ANGINA PECTORIS: ICD-10-CM

## 2022-01-31 DIAGNOSIS — E78.00 HYPERCHOLESTEROLEMIA: ICD-10-CM

## 2022-01-31 DIAGNOSIS — Z00.00 ENCOUNTER FOR ANNUAL PHYSICAL EXAM: Primary | ICD-10-CM

## 2022-01-31 PROCEDURE — 3078F DIAST BP <80 MM HG: CPT | Performed by: NURSE PRACTITIONER

## 2022-01-31 PROCEDURE — 99396 PREV VISIT EST AGE 40-64: CPT | Performed by: NURSE PRACTITIONER

## 2022-01-31 PROCEDURE — 3008F BODY MASS INDEX DOCD: CPT | Performed by: NURSE PRACTITIONER

## 2022-01-31 PROCEDURE — 3074F SYST BP LT 130 MM HG: CPT | Performed by: NURSE PRACTITIONER

## 2022-01-31 RX ORDER — SIMVASTATIN 20 MG
20 TABLET ORAL NIGHTLY
Qty: 90 TABLET | Refills: 3 | Status: SHIPPED | OUTPATIENT
Start: 2022-01-31

## 2022-01-31 NOTE — PATIENT INSTRUCTIONS
Check with your insurance to verify coverage for the Shingrix vaccine for shingles. Also check to see where you can go to get the vaccine. TDAP vaccine is due after October     Get your lab done.  If you take a multivitamin with Biotin or any biotin pro

## 2022-01-31 NOTE — PROGRESS NOTES
CHIEF COMPLAINT   Complete physical, med check    HPI:   Cabrera Almendarez is a 54year old female who presents for a complete physical exam.     Wt Readings from Last 6 Encounters:  01/31/22 : 161 lb 1.6 oz (73.1 kg)  07/23/21 : 143 lb (64.9 kg)  01/27/21 : BY MOUTH EVERY DAY AT NIGHT 30 tablet 0   • ELVA 24 FE 1-20 MG-MCG(24) Oral Chew Tab CHEW AND SWALLOW ONE TABLET BY MOUTH ONE TIME DAILY 84 tablet 0   • triamcinolone acetonide 0.1 % External Cream Apply 1 Application topically 2 (two) times daily as (osteopenia) Mother    • Other (tobacco) Brother    • Other (cabg) Brother    • Heart Attack Father 45   • Heart Disease Father    • Heart Disorder Father         massive heart attack 45   • Other (tobacco) Father    • Diabetes Other    • Heart Disorder Br wheezing  CARDIO: RRR without murmur  GI: good BS's,no masses, organomegaly or tenderness  :DEFERRED TO GYNE   MUSCULOSKELETAL: No obvious joint deformity or swelling. Normal gait.   EXTREMITIES: no cyanosis, clubbing or edema  NEURO: Oriented times thre reviewed. 2. Coronary artery disease involving native coronary artery of native heart without angina pectoris  3. Hypercholesterolemia  - stable. Continue statin and asa.   - simvastatin 20 MG Oral Tab; Take 1 tablet (20 mg total) by mouth nightly.   Joselin Langston

## 2022-02-11 ENCOUNTER — TELEPHONE (OUTPATIENT)
Dept: OBGYN CLINIC | Facility: CLINIC | Age: 52
End: 2022-02-11

## 2022-02-14 ENCOUNTER — TELEPHONE (OUTPATIENT)
Dept: OBGYN CLINIC | Facility: CLINIC | Age: 52
End: 2022-02-14

## 2022-02-14 NOTE — TELEPHONE ENCOUNTER
Cecilia Cordoba called from [facility] asking for the order for Breast US faxed to her at [phone]

## 2022-02-15 ENCOUNTER — TELEPHONE (OUTPATIENT)
Dept: OBGYN CLINIC | Facility: CLINIC | Age: 52
End: 2022-02-15

## 2022-02-15 NOTE — TELEPHONE ENCOUNTER
Fax request for breast ultrasound received. Order placed and routed for signature. Please fax to [facility] once signed.

## 2022-02-21 DIAGNOSIS — Z01.419 WELL WOMAN EXAM WITH ROUTINE GYNECOLOGICAL EXAM: ICD-10-CM

## 2022-02-21 RX ORDER — NORETHINDRONE ACETATE AND ETHINYL ESTRADIOL AND FERROUS FUMARATE 1MG-20(24)
KIT ORAL
Qty: 84 TABLET | Refills: 1 | Status: SHIPPED | OUTPATIENT
Start: 2022-02-21 | End: 2022-07-25

## 2022-03-03 ENCOUNTER — MED REC SCAN ONLY (OUTPATIENT)
Dept: INTERNAL MEDICINE CLINIC | Facility: CLINIC | Age: 52
End: 2022-03-03

## 2022-05-11 ENCOUNTER — TELEPHONE (OUTPATIENT)
Dept: INTERNAL MEDICINE CLINIC | Facility: CLINIC | Age: 52
End: 2022-05-11

## 2022-06-07 RX ORDER — TRIAMCINOLONE ACETONIDE 1 MG/G
CREAM TOPICAL
Qty: 45 G | Refills: 0 | Status: SHIPPED | OUTPATIENT
Start: 2022-06-07 | End: 2022-12-08

## 2022-06-07 NOTE — TELEPHONE ENCOUNTER
Last OV relevant to medication: 1/31/22  Last refill date: 9/9/21    #/refills: 45g-1  When pt was asked to return for OV: Follow up in one year or sooner as needed  Upcoming appt/reason: n/a  Was pt informed of any over due labs: Yes, mychart reminder sent to pt

## 2022-07-23 ENCOUNTER — OFFICE VISIT (OUTPATIENT)
Dept: FAMILY MEDICINE CLINIC | Facility: CLINIC | Age: 52
End: 2022-07-23
Payer: COMMERCIAL

## 2022-07-23 VITALS
DIASTOLIC BLOOD PRESSURE: 70 MMHG | TEMPERATURE: 98 F | SYSTOLIC BLOOD PRESSURE: 120 MMHG | HEIGHT: 59 IN | HEART RATE: 93 BPM | OXYGEN SATURATION: 98 % | BODY MASS INDEX: 31.25 KG/M2 | RESPIRATION RATE: 18 BRPM | WEIGHT: 155 LBS

## 2022-07-23 DIAGNOSIS — B34.9 VIRAL ILLNESS: ICD-10-CM

## 2022-07-23 DIAGNOSIS — J02.9 SORE THROAT: Primary | ICD-10-CM

## 2022-07-23 LAB
CONTROL LINE PRESENT WITH A CLEAR BACKGROUND (YES/NO): YES YES/NO
KIT LOT #: NORMAL NUMERIC
STREP GRP A CUL-SCR: NEGATIVE

## 2022-07-24 LAB — SARS-COV-2 RNA RESP QL NAA+PROBE: NOT DETECTED

## 2022-07-25 ENCOUNTER — OFFICE VISIT (OUTPATIENT)
Dept: OBGYN CLINIC | Facility: CLINIC | Age: 52
End: 2022-07-25
Payer: COMMERCIAL

## 2022-07-25 VITALS
BODY MASS INDEX: 30.84 KG/M2 | HEART RATE: 90 BPM | SYSTOLIC BLOOD PRESSURE: 117 MMHG | WEIGHT: 153 LBS | DIASTOLIC BLOOD PRESSURE: 71 MMHG | HEIGHT: 59 IN

## 2022-07-25 DIAGNOSIS — Z12.4 PAPANICOLAOU SMEAR FOR CERVICAL CANCER SCREENING: ICD-10-CM

## 2022-07-25 DIAGNOSIS — Z12.31 ENCOUNTER FOR SCREENING MAMMOGRAM FOR BREAST CANCER: ICD-10-CM

## 2022-07-25 DIAGNOSIS — Z01.419 WELL WOMAN EXAM WITH ROUTINE GYNECOLOGICAL EXAM: Primary | ICD-10-CM

## 2022-07-25 PROCEDURE — 88175 CYTOPATH C/V AUTO FLUID REDO: CPT | Performed by: OBSTETRICS & GYNECOLOGY

## 2022-07-25 RX ORDER — NORETHINDRONE ACETATE AND ETHINYL ESTRADIOL 1MG-20(21)
1 KIT ORAL DAILY
Qty: 84 TABLET | Refills: 5 | Status: SHIPPED | COMMUNITY
Start: 2022-07-25 | End: 2023-10-18

## 2022-08-01 LAB — LAST PAP RESULT: NORMAL

## 2022-08-02 RX ORDER — TOLTERODINE TARTRATE 2 MG/1
TABLET, EXTENDED RELEASE ORAL
Qty: 90 TABLET | Refills: 3 | Status: SHIPPED | OUTPATIENT
Start: 2022-08-02

## 2022-08-09 DIAGNOSIS — Z01.419 WELL WOMAN EXAM WITH ROUTINE GYNECOLOGICAL EXAM: ICD-10-CM

## 2022-08-10 RX ORDER — NORETHINDRONE ACETATE AND ETHINYL ESTRADIOL AND FERROUS FUMARATE 1MG-20(24)
KIT ORAL
Qty: 84 TABLET | Refills: 1 | OUTPATIENT
Start: 2022-08-10

## 2022-10-26 LAB
AMB EXT BILIRUBIN, TOTAL: 0.2 MG/DL
AMB EXT BUN: 15 MG/DL
AMB EXT CALCIUM: 9.2
AMB EXT CHLORIDE: 107
AMB EXT CHOLESTEROL, TOTAL: 167 MG/DL
AMB EXT CMP ALT: 16 U/L
AMB EXT CMP AST: 15 U/L
AMB EXT CREATININE: 0.86 MG/DL
AMB EXT EGFR NON-AA: 82
AMB EXT GLUCOSE: 90 MG/DL
AMB EXT HDL CHOLESTEROL: 52 MG/DL
AMB EXT HEMATOCRIT: 40.1
AMB EXT HEMOGLOBIN: 13.2
AMB EXT MCV: 91
AMB EXT NON HDL CHOL: 115 MG/DL
AMB EXT PLATELETS: 278
AMB EXT POSTASSIUM: 4.8 MMOL/L
AMB EXT SODIUM: 144 MMOL/L
AMB EXT TOTAL PROTEIN: 6.7
AMB EXT TRIGLYCERIDES: 101 MG/DL
AMB EXT WBC: 5 X10(3)UL

## 2022-12-08 ENCOUNTER — TELEPHONE (OUTPATIENT)
Facility: CLINIC | Age: 52
End: 2022-12-08

## 2022-12-08 RX ORDER — TRIAMCINOLONE ACETONIDE 1 MG/G
CREAM TOPICAL
Qty: 45 G | Refills: 0 | Status: SHIPPED | OUTPATIENT
Start: 2022-12-08

## 2023-02-01 DIAGNOSIS — E78.00 HYPERCHOLESTEROLEMIA: ICD-10-CM

## 2023-02-01 RX ORDER — SIMVASTATIN 20 MG
TABLET ORAL
Qty: 90 TABLET | Refills: 0 | Status: SHIPPED | OUTPATIENT
Start: 2023-02-01 | End: 2023-02-06

## 2023-02-01 NOTE — TELEPHONE ENCOUNTER
Last OV relevant to medication: 1/31/22  Last refill date: 1/31/22 #90/refills: 3  When pt was asked to return for OV: 1 year  Upcoming appt/reason:   Future Appointments   Date Time Provider Mani Nicole   2/6/2023  4:20 PM ALEJO Virgen [facility] [address] [facility]   7/27/2023  9:00 AM Mlidred Jay MD [facility] [place]     Was pt informed of any over due labs: none overdue  Lab Results   Component Value Date    [place] [id number] 01/25/2020    AST 15 10/25/2021    ALT 11 10/25/2021    BILT 0.2 10/25/2021    TP 6.6 10/25/2021    ALB 3.3 (L) 01/25/2020     Lab Results   Component Value Date    CHOLEST 172 10/25/2021    TRIG 62 10/25/2021    HDL 62 10/25/2021     (H) 01/25/2020    VLDL 12 01/25/2020    TCHDLRATIO 2.80 10/25/2021    [place] 110 10/25/2021

## 2023-02-06 ENCOUNTER — OFFICE VISIT (OUTPATIENT)
Dept: INTERNAL MEDICINE CLINIC | Facility: CLINIC | Age: 53
End: 2023-02-06
Payer: COMMERCIAL

## 2023-02-06 ENCOUNTER — LAB ENCOUNTER (OUTPATIENT)
Dept: LAB | Age: 53
End: 2023-02-06
Attending: NURSE PRACTITIONER
Payer: COMMERCIAL

## 2023-02-06 VITALS
TEMPERATURE: 98 F | DIASTOLIC BLOOD PRESSURE: 76 MMHG | WEIGHT: 160.13 LBS | OXYGEN SATURATION: 97 % | SYSTOLIC BLOOD PRESSURE: 110 MMHG | HEIGHT: 59.25 IN | RESPIRATION RATE: 16 BRPM | BODY MASS INDEX: 32.28 KG/M2 | HEART RATE: 79 BPM

## 2023-02-06 DIAGNOSIS — Z00.00 ENCOUNTER FOR ANNUAL PHYSICAL EXAM: Primary | ICD-10-CM

## 2023-02-06 DIAGNOSIS — E66.09 CLASS 1 OBESITY DUE TO EXCESS CALORIES WITHOUT SERIOUS COMORBIDITY IN ADULT, UNSPECIFIED BMI: ICD-10-CM

## 2023-02-06 DIAGNOSIS — Z13.29 SCREENING FOR THYROID DISORDER: ICD-10-CM

## 2023-02-06 DIAGNOSIS — I77.9 BILATERAL CAROTID ARTERY DISEASE, UNSPECIFIED TYPE (HCC): ICD-10-CM

## 2023-02-06 DIAGNOSIS — E78.00 HYPERCHOLESTEROLEMIA: ICD-10-CM

## 2023-02-06 LAB — TSI SER-ACNC: 2 MIU/ML (ref 0.36–3.74)

## 2023-02-06 PROCEDURE — 84443 ASSAY THYROID STIM HORMONE: CPT | Performed by: NURSE PRACTITIONER

## 2023-02-06 RX ORDER — NORETHINDRONE ACETATE AND ETHINYL ESTRADIOL 1MG-20(21)
1 KIT ORAL DAILY
COMMUNITY

## 2023-02-06 RX ORDER — SIMVASTATIN 20 MG
20 TABLET ORAL NIGHTLY
Qty: 90 TABLET | Refills: 3 | Status: SHIPPED | OUTPATIENT
Start: 2023-02-06

## 2023-02-11 ENCOUNTER — HOSPITAL ENCOUNTER (OUTPATIENT)
Dept: ULTRASOUND IMAGING | Age: 53
Discharge: HOME | End: 2023-02-11
Attending: NURSE PRACTITIONER
Payer: COMMERCIAL

## 2023-02-11 DIAGNOSIS — I77.9 BILATERAL CAROTID ARTERY DISEASE, UNSPECIFIED TYPE (HCC): ICD-10-CM

## 2023-02-11 PROCEDURE — 93880 EXTRACRANIAL BILAT STUDY: CPT | Performed by: NURSE PRACTITIONER

## 2023-02-14 ENCOUNTER — MED REC SCAN ONLY (OUTPATIENT)
Dept: INTERNAL MEDICINE CLINIC | Facility: CLINIC | Age: 53
End: 2023-02-14

## 2023-02-20 ENCOUNTER — TELEPHONE (OUTPATIENT)
Facility: CLINIC | Age: 53
End: 2023-02-20

## 2023-02-20 DIAGNOSIS — Z12.39 ENCOUNTER FOR BREAST CANCER SCREENING OTHER THAN MAMMOGRAM: Primary | ICD-10-CM

## 2023-02-20 DIAGNOSIS — R92.2 DENSE BREASTS: ICD-10-CM

## 2023-02-20 NOTE — TELEPHONE ENCOUNTER
Order request received from [facility] for [address] (patient is schedule on 2/25/23).  Order placed and routed to Dr. Brianna Mensah for approval. Once signed will fax to [facility] at [phone]

## 2023-03-28 RX ORDER — TRIAMCINOLONE ACETONIDE 1 MG/G
CREAM TOPICAL
Qty: 45 G | Refills: 1 | Status: SHIPPED | OUTPATIENT
Start: 2023-03-28

## 2023-03-28 NOTE — TELEPHONE ENCOUNTER
Last OV relevant to medication:2/6/23  Last refill date: 12/8/22    #/refills:45g/0  When pt was asked to return for OV: 1 year  Upcoming appt/reason: PE  Was pt informed of any over due labs:none pemding   Pt uses cream for eczema.

## 2023-04-16 ENCOUNTER — OFFICE VISIT (OUTPATIENT)
Dept: FAMILY MEDICINE CLINIC | Facility: CLINIC | Age: 53
End: 2023-04-16
Payer: COMMERCIAL

## 2023-04-16 VITALS
DIASTOLIC BLOOD PRESSURE: 80 MMHG | RESPIRATION RATE: 18 BRPM | SYSTOLIC BLOOD PRESSURE: 114 MMHG | BODY MASS INDEX: 32.25 KG/M2 | HEIGHT: 59 IN | OXYGEN SATURATION: 96 % | TEMPERATURE: 98 F | WEIGHT: 160 LBS | HEART RATE: 82 BPM

## 2023-04-16 DIAGNOSIS — J06.9 UPPER RESPIRATORY TRACT INFECTION, UNSPECIFIED TYPE: ICD-10-CM

## 2023-04-16 DIAGNOSIS — J02.9 SORE THROAT: Primary | ICD-10-CM

## 2023-04-16 PROCEDURE — 3079F DIAST BP 80-89 MM HG: CPT | Performed by: NURSE PRACTITIONER

## 2023-04-16 PROCEDURE — 99213 OFFICE O/P EST LOW 20 MIN: CPT | Performed by: NURSE PRACTITIONER

## 2023-04-16 PROCEDURE — 87081 CULTURE SCREEN ONLY: CPT | Performed by: NURSE PRACTITIONER

## 2023-04-16 PROCEDURE — 3074F SYST BP LT 130 MM HG: CPT | Performed by: NURSE PRACTITIONER

## 2023-04-16 PROCEDURE — 87880 STREP A ASSAY W/OPTIC: CPT | Performed by: NURSE PRACTITIONER

## 2023-04-16 PROCEDURE — 3008F BODY MASS INDEX DOCD: CPT | Performed by: NURSE PRACTITIONER

## 2023-06-07 ENCOUNTER — OFFICE VISIT (OUTPATIENT)
Dept: FAMILY MEDICINE CLINIC | Facility: CLINIC | Age: 53
End: 2023-06-07
Payer: COMMERCIAL

## 2023-06-07 VITALS
HEIGHT: 59 IN | BODY MASS INDEX: 32.25 KG/M2 | SYSTOLIC BLOOD PRESSURE: 122 MMHG | HEART RATE: 71 BPM | DIASTOLIC BLOOD PRESSURE: 78 MMHG | RESPIRATION RATE: 18 BRPM | TEMPERATURE: 97 F | OXYGEN SATURATION: 97 % | WEIGHT: 160 LBS

## 2023-06-07 DIAGNOSIS — L20.9 ATOPIC DERMATITIS, UNSPECIFIED TYPE: Primary | ICD-10-CM

## 2023-06-07 PROCEDURE — 99213 OFFICE O/P EST LOW 20 MIN: CPT | Performed by: PHYSICIAN ASSISTANT

## 2023-06-07 PROCEDURE — 3008F BODY MASS INDEX DOCD: CPT | Performed by: PHYSICIAN ASSISTANT

## 2023-06-07 PROCEDURE — 3074F SYST BP LT 130 MM HG: CPT | Performed by: PHYSICIAN ASSISTANT

## 2023-06-07 PROCEDURE — 3078F DIAST BP <80 MM HG: CPT | Performed by: PHYSICIAN ASSISTANT

## 2023-06-07 RX ORDER — METHYLPREDNISOLONE 4 MG/1
TABLET ORAL
Qty: 21 EACH | Refills: 0 | Status: SHIPPED | OUTPATIENT
Start: 2023-06-07

## 2023-07-27 ENCOUNTER — OFFICE VISIT (OUTPATIENT)
Facility: CLINIC | Age: 53
End: 2023-07-27
Payer: COMMERCIAL

## 2023-07-27 VITALS
BODY MASS INDEX: 31.65 KG/M2 | HEART RATE: 82 BPM | WEIGHT: 157 LBS | HEIGHT: 59 IN | DIASTOLIC BLOOD PRESSURE: 89 MMHG | SYSTOLIC BLOOD PRESSURE: 130 MMHG

## 2023-07-27 DIAGNOSIS — Z01.419 WELL WOMAN EXAM WITH ROUTINE GYNECOLOGICAL EXAM: Primary | ICD-10-CM

## 2023-07-27 DIAGNOSIS — N39.3 SUI (STRESS URINARY INCONTINENCE, FEMALE): ICD-10-CM

## 2023-07-27 DIAGNOSIS — Z12.31 ENCOUNTER FOR SCREENING MAMMOGRAM FOR BREAST CANCER: ICD-10-CM

## 2023-07-27 DIAGNOSIS — Z12.4 PAPANICOLAOU SMEAR FOR CERVICAL CANCER SCREENING: ICD-10-CM

## 2023-07-27 PROCEDURE — 3079F DIAST BP 80-89 MM HG: CPT | Performed by: OBSTETRICS & GYNECOLOGY

## 2023-07-27 PROCEDURE — 99396 PREV VISIT EST AGE 40-64: CPT | Performed by: OBSTETRICS & GYNECOLOGY

## 2023-07-27 PROCEDURE — 3075F SYST BP GE 130 - 139MM HG: CPT | Performed by: OBSTETRICS & GYNECOLOGY

## 2023-07-27 PROCEDURE — 3008F BODY MASS INDEX DOCD: CPT | Performed by: OBSTETRICS & GYNECOLOGY

## 2023-07-27 RX ORDER — TOLTERODINE TARTRATE 2 MG/1
2 TABLET, EXTENDED RELEASE ORAL DAILY
Qty: 90 TABLET | Refills: 0 | Status: SHIPPED | OUTPATIENT
Start: 2023-07-27 | End: 2023-07-27

## 2023-07-27 RX ORDER — TOLTERODINE TARTRATE 2 MG/1
2 TABLET, EXTENDED RELEASE ORAL DAILY
Qty: 90 TABLET | Refills: 5 | Status: SHIPPED | OUTPATIENT
Start: 2023-07-27

## 2023-07-27 NOTE — PROGRESS NOTES
Vonda Hayes is a 50year old female  No LMP recorded. (Menstrual status: Continuous Pill). Patient presents with:  Wellness Visit  . No vaginal bleeding she takes the pill continuously. No hot flashes or night sweats or vaginal dryness. She did a Cologuard 2 years ago. Blood work is done with her primary. No dysuria her urinary frequency is somewhat improved but she still has some. She occasionally has stress urinary incontinence. Try pelvic floor therapy. OBSTETRICS HISTORY:  OB History    Para Term  AB Living   2 2 2     2   SAB IAB Ectopic Multiple Live Births           2      # Outcome Date GA Lbr [place]/2nd Weight Sex Delivery Anes PTL Lv   2 Term 08 40w0d   M NORMAL SPONT      1 Term 05 40w0d   F NORMAL SPONT          GYNE HISTORY:  Periods absent      Sexual activity:   Yes      Partners:   Male      Birth control/ protection:   Pill, OCP      Comment:   continously takes pill            Pap Date: 22  Pap Result Notes: Negative        MEDICAL HISTORY:  Past Medical History:   Diagnosis Date    Abnormal ultrasound of breast 02/15/2022    ABUS:no sonographic evidence of malignancy in either breast    Allergic rhinitis     Amenorrhea for years    on birth control    Ankle sprain 2006    Arthritis mentioned by Dr Martinez Austin last year.     Wondering how I can handle discomfort and weakness    Bilateral breast cysts 2020    Ultrasound of both breast negative for malignancy    Breast cancer screening other than mammogram 2023    ABUS negative    Colon cancer screening 2021    Cologuard = Negative    Genital warts     H/O mammogram 2023    negative     History of biopsy 2012    vulva biopsy - negative for dysplasia,viral effect, and malignancy    History of mammogram 2022,2019, 2018    benign-MCAI    Human papilloma virus infection 2015    pap negative    Hypercholesterolemia     Hyperlipidemia     Multiple allergies Pap smear for cervical cancer screening 06/15,,,,,,,,,,,,    Parotitis     Sexually transmitted disease        SURGICAL HISTORY:  Past Surgical History:   Procedure Laterality Date    Colposcopy, cervix w/upper adjacent vagina; w/biopsy(s), cervix      Labial biopsy - jar(s): 2      normal    Laser surgery of cervix        2005, 2008    Oral surgery  2019    Other surgical history  10/2014    oral implant     [place] teeth removed         SOCIAL HISTORY:  Social History    Socioeconomic History      Marital status:       Spouse name: Not on file      Number of children: Not on file      Years of education: Not on file      Highest education level: Not on file    Occupational History      Not on file    Tobacco Use      Smoking status: Never      Smokeless tobacco: Never    Vaping Use      Vaping Use: Never used    Substance and Sexual Activity      Alcohol use: Yes        Comment: occasional      Drug use: No      Sexual activity: Yes        Partners: Male        Birth control/protection: Pill, OCP        Comment: continously takes pill     Other Topics      Concerns:         Service: Not Asked        Blood Transfusions: Not Asked        Caffeine Concern: Yes          no concern, I drink coffee        Occupational Exposure: Not Asked        Hobby Hazards: Not Asked        Sleep Concern: Not Asked        Stress Concern: No        Weight Concern: Yes          I would like to lose weight        Special Diet: No        Back Care: Not Asked        Exercise: Yes          I exercise typically 5-6 days a week        Bike Helmet: Not Asked        Seat Belt: Yes          I wear my seatbelt, no question        Self-Exams: Not Asked    Social History Narrative      Not on file    Social Determinants of Health  Financial Resource Strain: Not on file  Food Insecurity: Not on file  Transportation Needs: Not on file  Physical Activity: Not on file  Stress: Not on file  Social Connections: Not on file  Housing Stability: Not on file    FAMILY HISTORY:  Family History   Problem Relation Age of Onset    Heart Attack Father 45    Heart Disease Father     Heart Disorder Father         massive heart attack 45    Other (tobacco) Father     Other (osteopenia) Mother     No Known Problems Daughter     No Known Problems Son     No Known Problems Maternal Grandmother     No Known Problems Maternal Grandfather     No Known Problems Paternal Grandmother     No Known Problems Paternal Grandfather     Heart Disorder Brother     Hypertension Brother     Lipids Brother     Other (tobacco) Brother     Other (cabg) Brother     Heart Disorder Brother         quadruple bipass at 36    Prostate Cancer Neg     Breast Cancer Neg     Colon Cancer Neg     Ovarian Cancer Neg     Uterine Cancer Neg     Pancreatic Cancer Neg        MEDICATIONS:    Current Outpatient Medications:     tolterodine 2 MG Oral Tab, Take 1 tablet (2 mg total) by mouth daily. , Disp: 90 tablet, Rfl: 0    TRIAMCINOLONE 0.1 % External Cream, APPLY 1 APPLICATION TOPICALLY TO AFFECTED AREA TWO TIMES DAILY AS NEEDED, Disp: 45 g, Rfl: 1    Norethin Ace-Eth Estrad-FE (JUNEL FE 1/20) 1-20 MG-MCG Oral Tab, Take 1 tablet by mouth daily. , Disp: , Rfl:     simvastatin 20 MG Oral Tab, Take 1 tablet (20 mg total) by mouth nightly., Disp: 90 tablet, Rfl: 3    Ascorbic Acid (VITAMIN C OR), Take 1 tablet by mouth daily. , Disp: , Rfl:     aspirin 81 MG Oral Tab, Take 1 tablet (81 mg total) by mouth daily. , Disp: , Rfl:     Cholecalciferol (VITAMIN D) 1000 UNITS Oral Tab, Take 1 tablet by mouth daily.  5000 Units , Disp: , Rfl:     ALLERGIES:    Dicloxacillin           HIVES  Pcns [Penicillins]      HIVES      Review of Systems:  Constitutional:  Denies fatigue, night sweats, hot flashes  Gastrointestinal:  denies heartburn, abdominal pain, diarrhea or constipation  Genitourinary:  denies dysuria, incontinence, abnormal vaginal discharge, vaginal itching  Skin/Breast:  Denies any breast pain, lumps, or discharge. Neurological:  denies headaches, extremity weakness or numbness. PHYSICAL EXAM:   Constitutional: well developed, well nourished  Head/Face: normocephalic  Neck/Thyroid: thyroid symmetric, no thyromegaly, no nodules, no adenopathy  Breast: normal without palpable masses, tenderness, asymmetry, nipple discharge, nipple retraction or skin changes  Abdomen:  soft, nontender, nondistended, no masses  Skin/Hair: no unusual rashes or bruises   Extremities: no edema, no cyanosis  Psychiatric:  Oriented to time, place, person and situation. Appropriate mood and affect    Pelvic Exam:  External Genitalia: normal appearance, hair distribution, and no lesions  Urethral Meatus:  normal in size, location, without lesions and prolapse  Bladder:  No fullness, masses or tenderness  Vagina:  Normal appearance without lesions, no abnormal discharge  Cervix:  Normal without tenderness on motion without lesions Pap. Uterus: normal in size, contour, position, mobility, without tenderness  Adnexa: normal without masses or tenderness  Perineum: normal  Anus: no hemorroids     Assessment & Plan:  Dion Armenta was seen today for wellness visit. Diagnoses and all orders for this visit:    Well woman exam with routine gynecological exam        Urinary incontinence some stress incontinence physical therapy. Pap. DC birth control pill. Progesterone only pill if required.

## 2023-08-04 ENCOUNTER — OFFICE VISIT (OUTPATIENT)
Dept: FAMILY MEDICINE CLINIC | Facility: CLINIC | Age: 53
End: 2023-08-04
Payer: COMMERCIAL

## 2023-08-04 VITALS
SYSTOLIC BLOOD PRESSURE: 112 MMHG | HEART RATE: 84 BPM | BODY MASS INDEX: 31.65 KG/M2 | DIASTOLIC BLOOD PRESSURE: 74 MMHG | WEIGHT: 157 LBS | RESPIRATION RATE: 16 BRPM | OXYGEN SATURATION: 98 % | TEMPERATURE: 98 F | HEIGHT: 59 IN

## 2023-08-04 DIAGNOSIS — R21 RASH: Primary | ICD-10-CM

## 2023-08-04 PROCEDURE — 3078F DIAST BP <80 MM HG: CPT | Performed by: FAMILY MEDICINE

## 2023-08-04 PROCEDURE — 3008F BODY MASS INDEX DOCD: CPT | Performed by: FAMILY MEDICINE

## 2023-08-04 PROCEDURE — 99213 OFFICE O/P EST LOW 20 MIN: CPT | Performed by: FAMILY MEDICINE

## 2023-08-04 PROCEDURE — 3074F SYST BP LT 130 MM HG: CPT | Performed by: FAMILY MEDICINE

## 2023-08-04 RX ORDER — PREDNISONE 20 MG/1
TABLET ORAL
Qty: 10 TABLET | Refills: 0 | Status: SHIPPED | OUTPATIENT
Start: 2023-08-04

## 2023-08-04 NOTE — PATIENT INSTRUCTIONS
Take prednisone taper as directed. Take in the AM with food. Use benadryl at bedtime to relieve itching and help you sleep. Consider zyrtec during the day to relieve itching. You can also try Aveeno bath powder in lukewarm water to relieve itching. If itching is severe apply ice packs to affected area. You may also apply topical cortisone cream to particularly itchy areas. If no better in 2-3 days, follow-up for re-evaluation.

## 2023-08-14 ENCOUNTER — TELEMEDICINE (OUTPATIENT)
Dept: INTERNAL MEDICINE CLINIC | Facility: CLINIC | Age: 53
End: 2023-08-14
Payer: COMMERCIAL

## 2023-08-14 DIAGNOSIS — R21 RASH AND NONSPECIFIC SKIN ERUPTION: Primary | ICD-10-CM

## 2023-08-14 LAB — LAST PAP RESULT: NORMAL

## 2023-08-14 NOTE — PATIENT INSTRUCTIONS
Monitor if the rash returns.      If it returns take a picture and send it through [facility]    Avoid products with fragrances     Follow up as needed or when routine care is due

## 2023-08-14 NOTE — PROGRESS NOTES
Virtual video Gabriel verbally consents to a Virtual/video Check-In visit on 08/14/23. Patient has been referred to the [facility] website at [email or web address] to review the yearly Consent to Treat document. Patient understands and accepts financial responsibility for any deductible, co-insurance and/or co-pays associated with this service. Duration of the service: 12 minutes    Tesfaye Oropeza is a 48year old female. CHIEF COMPLAINT   Rash    HPI:   Had rash after pool- weds third week in July- had a rash to arms, chest, trunk. Had a new sunscreen. Did benedryl and zyrtec and it helped but when stopped it got worse, was dx with allergy at [facility] or [facility]- 40 mg pred for 5 days. it helped- did make her sleepless. After the pred was done the rash started to come back last Thursday and Friday it came back. Took benedryl and it helped. Has not taken benadryl since Saturday and rash is not present. Current Outpatient Medications   Medication Sig Dispense Refill    ZINC OR Take 1 tablet by mouth as needed. tolterodine 2 MG Oral Tab Take 1 tablet (2 mg total) by mouth daily. 90 tablet 5    TRIAMCINOLONE 0.1 % External Cream APPLY 1 APPLICATION TOPICALLY TO AFFECTED AREA TWO TIMES DAILY AS NEEDED 45 g 1    simvastatin 20 MG Oral Tab Take 1 tablet (20 mg total) by mouth nightly. 90 tablet 3    Ascorbic Acid (VITAMIN C OR) Take 1 tablet by mouth daily. aspirin 81 MG Oral Tab Take 1 tablet (81 mg total) by mouth daily. Cholecalciferol (VITAMIN D) 1000 UNITS Oral Tab Take 2 tablets by mouth daily. Past Medical History:   Diagnosis Date    Abnormal ultrasound of breast 02/15/2022    ABUS:no sonographic evidence of malignancy in either breast    Allergic rhinitis     Amenorrhea for years    on birth control    Ankle sprain 05/2006    Arthritis mentioned by Dr Jevon Oliver last year.     Wondering how I can handle discomfort and weakness    Bilateral breast cysts 02/26/2020    Ultrasound of both breast negative for malignancy    Breast cancer screening other than mammogram 02/25/2023    ABUS negative    Colon cancer screening 08/27/2021    Cologuard = Negative    Genital warts 1994    H/O mammogram 02/25/2023    negative     History of biopsy 06/22/2012    vulva biopsy - negative for dysplasia,viral effect, and malignancy    History of mammogram 02/2022,02/2019, 02/02/2018    benign-MCAI    Human papilloma virus infection 06/29/2015    pap negative    Hypercholesterolemia     Hyperlipidemia     Multiple allergies     Pap smear for cervical cancer screening 06/15,06/12,05/09,05/08,08/07,08/06,08/05,11/04,07/04,07/03,07/02,06/01,06/00    Parotitis     Sexually transmitted disease       Social History:  Social History     Socioeconomic History    Marital status:    Tobacco Use    Smoking status: Never     Passive exposure: Never    Smokeless tobacco: Never   Vaping Use    Vaping Use: Never used   Substance and Sexual Activity    Alcohol use: Yes     Comment: occasional    Drug use: No    Sexual activity: Yes     Partners: Male     Birth control/protection: Pill, OCP     Comment: continously takes pill    Other Topics Concern    Caffeine Concern Yes     Comment: no concern, I drink coffee    Stress Concern No    Weight Concern Yes     Comment: I would like to lose weight    Special Diet No    Exercise Yes     Comment: I exercise typically 5-6 days a week    Seat Belt Yes     Comment: I wear my seatbelt, no question        REVIEW OF SYSTEMS:   See HPI    EXAM:   Limited due to VV  GENERAL: does not sound like they are in any acute distress on the video  LUNGS: They are able to phonate clearly without pausing due to sob, there was no coughing during the visit.   NEURO: Oriented times three  SKIN: see photos below    axilla       chest      axilla      Back      neck        LABS:      Lab Results   Component Value Date    WBC 5.0 10/26/2022    RBC 4.59 10/12/2020    HGB 13.2 10/26/2022    HCT 40.1 10/26/2022    MCV 91 10/26/2022    MCH 29.2 10/12/2020    MCHC 31.1 10/12/2020    RDW 13.3 10/12/2020     10/26/2022      Lab Results   Component Value Date    GLU 90 10/26/2022    BUN 15 10/26/2022    BUNCREA 17.2 01/25/2020    CREATSERUM 0.86 10/26/2022    ANIONGAP 4 01/25/2020    GFR 92 11/02/2020    GFRNAA 82 10/26/2022    GFRAA 92 01/25/2020    CA 9.2 10/26/2022    OSMOCALC 294 01/25/2020    ALKPHO 56 01/25/2020    AST 15 10/26/2022    ALT 16 10/26/2022    BILT 0.2 10/26/2022    TP 6.7 10/26/2022    ALB 3.3 (L) 01/25/2020    GLOBULIN 3.6 01/25/2020    AGRATIO 1.4 12/29/2015     01/25/2020    K 4.8 10/26/2022     10/26/2022    CO2 27.0 01/25/2020      Lab Results   Component Value Date    CHOLEST 167 10/26/2022    TRIG 101 10/26/2022    HDL 52 10/26/2022     (H) 01/25/2020    VLDL 12 01/25/2020    TCHDLRATIO 3.20 10/26/2022    NONHDLC 115 10/26/2022      Lab Results   Component Value Date    T4F 1.3 01/25/2018    TSH 2.000 02/06/2023    TSHT4 1.94 12/29/2015      Lab Results   Component Value Date     01/25/2020    A1C 5.2 01/25/2020        ASSESSMENT AND PLAN:   1. Rash and nonspecific skin eruption  - almost completely resolved. Likely allergic in nature but unknown cause. - avoid fragrances in products. - avoid new products  - if the rash returns may need allergy testing, [facility]  - continue topical steroid and benadryl as needed       The patient indicates understanding of these issues and agrees to the plan. The patient is asked to return as needed or when routine care is due. Please note that the following visit was completed using two-way, real-time interactive audio and/or video communication. This has been done in good milo to provide continuity of care in the best interest of the provider-patient relationship, due to the ongoing public health crisis/national emergency and because of restrictions of visitation.   There are limitations of this visit as no physical exam could be performed. Every conscious effort was taken to allow for sufficient and adequate time. This billing was spent on reviewing labs, medications, radiology tests and decision making. Appropriate medical decision-making and tests are ordered as detailed in the plan of care above.

## 2023-10-04 ENCOUNTER — OFFICE VISIT (OUTPATIENT)
Dept: INTERNAL MEDICINE CLINIC | Facility: CLINIC | Age: 53
End: 2023-10-04
Payer: COMMERCIAL

## 2023-10-04 VITALS
BODY MASS INDEX: 31.01 KG/M2 | OXYGEN SATURATION: 99 % | TEMPERATURE: 99 F | RESPIRATION RATE: 16 BRPM | HEIGHT: 59 IN | SYSTOLIC BLOOD PRESSURE: 114 MMHG | DIASTOLIC BLOOD PRESSURE: 80 MMHG | WEIGHT: 153.81 LBS | HEART RATE: 93 BPM

## 2023-10-04 DIAGNOSIS — R21 RASH AND NONSPECIFIC SKIN ERUPTION: Primary | ICD-10-CM

## 2023-10-04 PROCEDURE — 3074F SYST BP LT 130 MM HG: CPT | Performed by: NURSE PRACTITIONER

## 2023-10-04 PROCEDURE — 99213 OFFICE O/P EST LOW 20 MIN: CPT | Performed by: NURSE PRACTITIONER

## 2023-10-04 PROCEDURE — 3008F BODY MASS INDEX DOCD: CPT | Performed by: NURSE PRACTITIONER

## 2023-10-04 PROCEDURE — 3079F DIAST BP 80-89 MM HG: CPT | Performed by: NURSE PRACTITIONER

## 2023-10-04 NOTE — PATIENT INSTRUCTIONS
See the dermatologist    See the allergist    Continue the zyrtec as needed and topical medication as needed    Follow up as needed or when routine care is due

## 2023-10-25 LAB
AMB EXT BUN: 16 MG/DL
AMB EXT CALCIUM: 9
AMB EXT CHLORIDE: 104
AMB EXT CHOLESTEROL, TOTAL: 151 MG/DL
AMB EXT CMP ALT: 21 U/L
AMB EXT CMP AST: 20 U/L
AMB EXT CREATININE: 0.68 MG/DL
AMB EXT EGFR NON-AA: 105
AMB EXT GLUCOSE: 87 MG/DL
AMB EXT HDL CHOLESTEROL: 52 MG/DL
AMB EXT HEMATOCRIT: 33.3
AMB EXT HEMOGLOBIN: 10.6
AMB EXT MCV: 89
AMB EXT NON HDL CHOL: 99 MG/DL
AMB EXT PLATELETS: 223
AMB EXT POSTASSIUM: 4.2 MMOL/L
AMB EXT SODIUM: 142 MMOL/L
AMB EXT TOTAL PROTEIN: 6.5
AMB EXT TRIGLYCERIDES: 68 MG/DL
AMB EXT WBC: 5.3 X10(3)UL

## 2023-11-16 ENCOUNTER — PATIENT MESSAGE (OUTPATIENT)
Dept: INTERNAL MEDICINE CLINIC | Facility: CLINIC | Age: 53
End: 2023-11-16

## 2023-11-16 NOTE — TELEPHONE ENCOUNTER
From: Paco Liu  To: Griselda Quinones  Sent: 11/16/2023 10:17 AM CST  Subject: Wellness screening results    I am sending the results from the wellness screening completed through my job on 10/25/2023. There are some levels in risk areas, so I wanted to let you know before my physical in a couple of months.

## 2023-11-16 NOTE — TELEPHONE ENCOUNTER
Anemia present. Have her come see me to discuss now instead of waiting. In the next week or two is okay. Thanks.

## 2023-11-16 NOTE — TELEPHONE ENCOUNTER
Please see patient message. Printed out results. Results go back to 2020. Placed in provider bin for review. Routed to provider. Entered ext result console for listed below. Results collected 10/25/23:  Uric acid 2.9  BUN/creatinine ratio 24  Phosphorus 4.7  Magnesium 2.0  Total bilirubin less than 0.2  LDL Chol Calc 85  T.  Chol/HDL ratio 2.9  RBC 3.76  HGB 10.6  HCT 33.3      1026/22 results:  Chloride 107  Height 59 inches  BMI 31.3 %

## 2023-11-20 NOTE — TELEPHONE ENCOUNTER
Future Appointments   Date Time Provider Mani Nicole   11/21/2023  1:20 PM Deric Costa APRN [facility]   7/29/2024  9:00 AM Elvin Feliciano MD [facility]

## 2023-11-21 ENCOUNTER — OFFICE VISIT (OUTPATIENT)
Dept: INTERNAL MEDICINE CLINIC | Facility: CLINIC | Age: 53
End: 2023-11-21
Payer: COMMERCIAL

## 2023-11-21 VITALS
WEIGHT: 154.19 LBS | BODY MASS INDEX: 31.08 KG/M2 | HEIGHT: 59 IN | OXYGEN SATURATION: 99 % | DIASTOLIC BLOOD PRESSURE: 76 MMHG | SYSTOLIC BLOOD PRESSURE: 116 MMHG | TEMPERATURE: 98 F | RESPIRATION RATE: 16 BRPM | HEART RATE: 75 BPM

## 2023-11-21 DIAGNOSIS — E83.39 HYPERPHOSPHATEMIA: ICD-10-CM

## 2023-11-21 DIAGNOSIS — R21 RASH AND NONSPECIFIC SKIN ERUPTION: ICD-10-CM

## 2023-11-21 DIAGNOSIS — D64.9 NORMOCYTIC ANEMIA: Primary | ICD-10-CM

## 2023-11-21 PROCEDURE — 3078F DIAST BP <80 MM HG: CPT | Performed by: NURSE PRACTITIONER

## 2023-11-21 PROCEDURE — 3074F SYST BP LT 130 MM HG: CPT | Performed by: NURSE PRACTITIONER

## 2023-11-21 PROCEDURE — 99214 OFFICE O/P EST MOD 30 MIN: CPT | Performed by: NURSE PRACTITIONER

## 2023-11-21 PROCEDURE — 3008F BODY MASS INDEX DOCD: CPT | Performed by: NURSE PRACTITIONER

## 2023-11-21 NOTE — PATIENT INSTRUCTIONS
Get your labs done in 1 month     Do not donate blood until after your labs are done    Follow up as needed or when routine care is due

## 2023-11-22 ENCOUNTER — PATIENT MESSAGE (OUTPATIENT)
Dept: INTERNAL MEDICINE CLINIC | Facility: CLINIC | Age: 53
End: 2023-11-22

## 2023-11-22 NOTE — TELEPHONE ENCOUNTER
From: Cabrera Almendarez  To: Ericka Etienne  Sent: 11/22/2023 7:57 AM CST  Subject: wellness screening report    Here is the comprehensive report from October 2023. Sergeiry I sent the old one last time! Thank you!   Pao Delgado

## 2023-11-22 NOTE — TELEPHONE ENCOUNTER
Current labs were attached to last report and already reviewed by provider. BP in report 116/74, BMI 31.1. varinder, thank you.

## 2024-01-03 ENCOUNTER — LAB ENCOUNTER (OUTPATIENT)
Dept: LAB | Age: 54
End: 2024-01-03
Attending: NURSE PRACTITIONER
Payer: COMMERCIAL

## 2024-01-03 DIAGNOSIS — E83.39 HYPERPHOSPHATEMIA: ICD-10-CM

## 2024-01-03 DIAGNOSIS — D64.9 NORMOCYTIC ANEMIA: ICD-10-CM

## 2024-01-03 LAB
BASOPHILS # BLD AUTO: 0.03 X10(3) UL (ref 0–0.2)
BASOPHILS NFR BLD AUTO: 0.5 %
EOSINOPHIL # BLD AUTO: 0.24 X10(3) UL (ref 0–0.7)
EOSINOPHIL NFR BLD AUTO: 4 %
ERYTHROCYTE [DISTWIDTH] IN BLOOD BY AUTOMATED COUNT: 14.3 %
HCT VFR BLD AUTO: 40.5 %
HGB BLD-MCNC: 12.5 G/DL
IMM GRANULOCYTES # BLD AUTO: 0.01 X10(3) UL (ref 0–1)
IMM GRANULOCYTES NFR BLD: 0.2 %
LYMPHOCYTES # BLD AUTO: 1.42 X10(3) UL (ref 1–4)
LYMPHOCYTES NFR BLD AUTO: 23.6 %
MCH RBC QN AUTO: 27.4 PG (ref 26–34)
MCHC RBC AUTO-ENTMCNC: 30.9 G/DL (ref 31–37)
MCV RBC AUTO: 88.8 FL
MONOCYTES # BLD AUTO: 0.74 X10(3) UL (ref 0.1–1)
MONOCYTES NFR BLD AUTO: 12.3 %
NEUTROPHILS # BLD AUTO: 3.57 X10 (3) UL (ref 1.5–7.7)
NEUTROPHILS # BLD AUTO: 3.57 X10(3) UL (ref 1.5–7.7)
NEUTROPHILS NFR BLD AUTO: 59.4 %
PHOSPHATE SERPL-MCNC: 5.2 MG/DL (ref 2.5–4.9)
PLATELET # BLD AUTO: 263 10(3)UL (ref 150–450)
RBC # BLD AUTO: 4.56 X10(6)UL
WBC # BLD AUTO: 6 X10(3) UL (ref 4–11)

## 2024-01-03 PROCEDURE — 84100 ASSAY OF PHOSPHORUS: CPT

## 2024-01-03 PROCEDURE — 85025 COMPLETE CBC W/AUTO DIFF WBC: CPT

## 2024-01-20 RX ORDER — TRIAMCINOLONE ACETONIDE 1 MG/G
CREAM TOPICAL
Qty: 45 G | Refills: 1 | Status: SHIPPED | OUTPATIENT
Start: 2024-01-20

## 2024-02-28 ENCOUNTER — TELEPHONE (OUTPATIENT)
Facility: CLINIC | Age: 54
End: 2024-02-28

## 2024-02-28 DIAGNOSIS — R92.333 HETEROGENEOUSLY DENSE TISSUE OF BOTH BREASTS ON MAMMOGRAPHY: ICD-10-CM

## 2024-02-28 DIAGNOSIS — Z12.39 ENCOUNTER FOR BREAST CANCER SCREENING OTHER THAN MAMMOGRAM: Primary | ICD-10-CM

## 2024-02-28 NOTE — TELEPHONE ENCOUNTER
Fax received from Hortencia at [facility], requesting an order for an 'ABUS (CPT 71498) DX Z12.39 screening for breast cancer other than mammogram and R92.333 Heterogeneously dense breasts.' Pt has appt scheduled 03/01/2024 to have mammogram done.    Please advise

## 2024-04-08 ENCOUNTER — TELEPHONE (OUTPATIENT)
Facility: CLINIC | Age: 54
End: 2024-04-08

## 2024-04-08 NOTE — TELEPHONE ENCOUNTER
"  Answer Assessment - Initial Assessment Questions  1. LOCATION: "Where does it hurt?"       Abdomen   2. RADIATION: "Does the pain shoot anywhere else?" (e.g., chest, back)      No   3. ONSET: "When did the pain begin?" (e.g., minutes, hours or days ago)       Today   4. SUDDEN: "Gradual or sudden onset?"      Gradual   5. PATTERN "Does the pain come and go, or is it constant?"     - If constant: "Is it getting better, staying the same, or worsening?"       (Note: Constant means the pain never goes away completely; most serious pain is constant and it progresses)      - If intermittent: "How long does it last?" "Do you have pain now?"      (Note: Intermittent means the pain goes away completely between bouts)      Constant   6. SEVERITY: "How bad is the pain?"  (e.g., Scale 1-10; mild, moderate, or severe)    - MILD (1-3): doesn't interfere with normal activities, abdomen soft and not tender to touch     - MODERATE (4-7): interferes with normal activities or awakens from sleep, tender to touch     - SEVERE (8-10): excruciating pain, doubled over, unable to do any normal activities       Severe   7. RECURRENT SYMPTOM: "Have you ever had this type of abdominal pain before?" If so, ask: "When was the last time?" and "What happened that time?"       No   8. CAUSE: "What do you think is causing the abdominal pain?"      Unsure recent procedure   9. RELIEVING/AGGRAVATING FACTORS: "What makes it better or worse?" (e.g., movement, antacids, bowel movement)  10. OTHER SYMPTOMS: "Has there been any vomiting, diarrhea, constipation, or urine problems?"        No BM   11. PREGNANCY: "Is there any chance you are pregnant?" "When was your last menstrual period?"        N/A    Protocols used: ST ABDOMINAL PAIN - FEMALE-A-    " Breast ultrasound abstracted and placed in doctors bin for review and recommendations.

## 2024-04-14 DIAGNOSIS — E78.00 HYPERCHOLESTEROLEMIA: ICD-10-CM

## 2024-04-15 ENCOUNTER — HOSPITAL ENCOUNTER (OUTPATIENT)
Dept: GENERAL RADIOLOGY | Age: 54
Discharge: HOME | End: 2024-04-15
Attending: NURSE PRACTITIONER
Payer: COMMERCIAL

## 2024-04-15 ENCOUNTER — OFFICE VISIT (OUTPATIENT)
Dept: INTERNAL MEDICINE CLINIC | Facility: CLINIC | Age: 54
End: 2024-04-15
Payer: COMMERCIAL

## 2024-04-15 VITALS
SYSTOLIC BLOOD PRESSURE: 116 MMHG | TEMPERATURE: 98 F | HEIGHT: 59 IN | OXYGEN SATURATION: 96 % | HEART RATE: 83 BPM | RESPIRATION RATE: 14 BRPM | BODY MASS INDEX: 31.28 KG/M2 | WEIGHT: 155.13 LBS | DIASTOLIC BLOOD PRESSURE: 78 MMHG

## 2024-04-15 DIAGNOSIS — E83.39 HYPERPHOSPHATEMIA: ICD-10-CM

## 2024-04-15 DIAGNOSIS — Z00.00 ENCOUNTER FOR ANNUAL PHYSICAL EXAM: Primary | ICD-10-CM

## 2024-04-15 DIAGNOSIS — I77.9 BILATERAL CAROTID ARTERY DISEASE, UNSPECIFIED TYPE (HCC): ICD-10-CM

## 2024-04-15 DIAGNOSIS — M79.641 RIGHT HAND PAIN: ICD-10-CM

## 2024-04-15 DIAGNOSIS — Z12.11 SCREENING FOR COLON CANCER: ICD-10-CM

## 2024-04-15 DIAGNOSIS — E78.00 HYPERCHOLESTEROLEMIA: ICD-10-CM

## 2024-04-15 DIAGNOSIS — I25.10 CORONARY ARTERY DISEASE INVOLVING NATIVE CORONARY ARTERY OF NATIVE HEART WITHOUT ANGINA PECTORIS: ICD-10-CM

## 2024-04-15 PROCEDURE — 73130 X-RAY EXAM OF HAND: CPT | Performed by: NURSE PRACTITIONER

## 2024-04-15 RX ORDER — SIMVASTATIN 20 MG
20 TABLET ORAL NIGHTLY
Qty: 90 TABLET | Refills: 3 | Status: SHIPPED | OUTPATIENT
Start: 2024-04-15

## 2024-04-15 RX ORDER — FLUTICASONE PROPIONATE 50 MCG
SPRAY, SUSPENSION (ML) NASAL
COMMUNITY
Start: 2024-04-01

## 2024-04-15 RX ORDER — TRIAMCINOLONE ACETONIDE 1 MG/G
1 CREAM TOPICAL 2 TIMES DAILY PRN
Qty: 45 G | Refills: 1 | Status: SHIPPED | OUTPATIENT
Start: 2024-04-15

## 2024-04-15 RX ORDER — MOMETASONE FUROATE 1 MG/G
OINTMENT TOPICAL
COMMUNITY
Start: 2024-04-01

## 2024-04-15 NOTE — PATIENT INSTRUCTIONS
Get your xray done    See ortho    Continue exercise    My fitness pal [employer] or noom     Make an appointment to get your colonoscopy done     Continue your current medications    Get your lab done    Follow up in 1 year or sooner as needed    Tips to Control Acid Reflux    To control acid reflux, you’ll need to make some basic diet and lifestyle changes. The simple steps outlined below may be all you’ll need to ease discomfort.  Watch what you eat  Avoid fatty foods and spicy foods.  Eat fewer acidic foods, such as citrus and tomato-based foods. These can increase symptoms.  Limit drinking alcohol, caffeine, and fizzy beverages. All increase acid reflux.  Try limiting chocolate, peppermint, and spearmint. These can worsen acid reflux in some people.  Watch when you eat  Avoid lying down for 3 hours after eating.  Do not snack before going to bed.  Raise your head  Raising your head and upper body by 4 to 6 inches helps limit reflux when you’re lying down. Put blocks under the head of your bed frame to raise it.  Other changes  Lose weight, if you need to  Don’t exercise near bedtime  Avoid tight-fitting clothes  Limit aspirin and ibuprofen  Stop smoking   Date Last Reviewed: 7/1/2016  © 6261-8043 [employer]. [address] [place] [address], [place] [address]. All rights reserved. This information is not intended as a substitute for professional medical care. Always follow your healthcare professional's instructions.

## 2024-04-15 NOTE — PROGRESS NOTES
CHIEF COMPLAINT   Complete physical, med check    HPI:   Connie Bridges is a 53 year old female who presents for a complete physical exam, med check    Wt Readings from Last 6 Encounters:   04/15/24 155 lb 1.6 oz (70.4 kg)   11/21/23 154 lb 3.2 oz (69.9 kg)   10/04/23 153 lb 12.8 oz (69.8 kg)   08/04/23 157 lb (71.2 kg)   07/27/23 157 lb (71.2 kg)   06/07/23 160 lb (72.6 kg)     Body mass index is 31.33 kg/m².     Diet is fair and exercise is good. Vaccines reviewed. Wearing seat belt and no texting and driving. Feels safe at home. Sees gyne. Pap UTD. Mammo UTD. Is not always doing self breast exams. Colon cancer screening UTD. No smoking. Occasional alcohol. No skin concerns. Sees derm. Labs reviewed.     HLD/carotid disease/CAD- on statin and baby asa. No SE. Low fat diet and exercise.    Hand pain to right side- can be severe. Not taking anything. Would like to see ortho.    flu oct 25th       Cholesterol, Total (mg/dL)   Date Value   10/25/2023 151   10/26/2022 167   10/26/2022 167     CHOLESTEROL, TOTAL (mg/dL)   Date Value   12/29/2015 177   01/03/2015 190   11/21/2012 187     CHOLESTEROL (mg/dL)   Date Value   12/21/2013 163     HDL Cholesterol (mg/dL)   Date Value   10/25/2023 52   10/26/2022 52   10/26/2022 52     HDL CHOLESTEROL (mg/dL)   Date Value   12/29/2015 58   01/03/2015 62   11/21/2012 50     HDL CHOL (mg/dL)   Date Value   12/21/2013 46     LDL Cholesterol (mg/dL)   Date Value   01/25/2020 105 (H)   12/29/2018 129 (H)   01/25/2018 72     LDL-CHOLESTEROL (mg/dL (calc))   Date Value   12/29/2015 103   01/03/2015 114   11/21/2012 123     LDL CHOLESTROL (mg/dL)   Date Value   12/21/2013 104     AST (U/L)   Date Value   10/25/2023 20   10/26/2022 15   10/26/2022 15   01/25/2020 18   12/29/2018 17   01/25/2018 19   12/29/2015 18   01/03/2015 15   12/21/2013 20   11/21/2012 19     ALT (U/L)   Date Value   10/25/2023 21   10/26/2022 16   10/26/2022 16   01/25/2020 23   12/29/2018 22   01/25/2018 39    12/29/2015 15   01/03/2015 15   12/21/2013 32   11/21/2012 18        Current Outpatient Medications   Medication Sig Dispense Refill    mometasone 0.1 % External Ointment APPLY TO AFFECTED AREAS ON ARMS TWICE JONES. USE FOR 2 WEEKS      fluticasone propionate 50 MCG/ACT Nasal Suspension USE 1 SPRAY IN EACH NOSTRIL ONCE TO TWICE DAILY (PENDING COURSE)      TRIAMCINOLONE 0.1 % External Cream APPLY 1 APPLICATION TOPICALLY TO AFFECTED AREA TWO TIMES DAILY AS NEEDED 45 g 1    cholecalciferol 125 MCG (5000 UT) Oral Tab Take 1 tablet (5,000 Units total) by mouth daily.      ZINC OR Take 30 mg by mouth as needed (When around others who are sick).      tolterodine 2 MG Oral Tab Take 1 tablet (2 mg total) by mouth daily. 90 tablet 5    simvastatin 20 MG Oral Tab Take 1 tablet (20 mg total) by mouth nightly. 90 tablet 3    Ascorbic Acid (VITAMIN C OR) Take 360 mg by mouth daily.      aspirin 81 MG Oral Tab Take 1 tablet (81 mg total) by mouth daily.        Allergies   Allergen Reactions    Dicloxacillin HIVES    Pcns [Penicillins] HIVES      Past Medical History:    Abnormal ultrasound of breast    ABUS:no sonographic evidence of malignancy in either breast    Allergic rhinitis    Amenorrhea    on birth control    Ankle sprain    Arthritis    Wondering how I can handle discomfort and weakness    Bilateral breast cysts    Ultrasound of both breast negative for malignancy    Breast cancer screening other than mammogram    ABUS negative    Breast cancer screening other than mammogram    Ultrasound: RIGHT BREAST: No suspicious solid or cystic masses are seen. LEFT BREAST: No suspicious solid or cystic masses are seen.    Colon cancer screening    Cologuard = Negative    Genital warts    H/O mammogram    negative     H/O mammogram    1 year screening mammogram is recommended. Given the patient's breast density, screening automated breast ultrasound should be considered as a supplement to the patient's annual mammogram.    History  of biopsy    vulva biopsy - negative for dysplasia,viral effect, and malignancy    History of mammogram    benign-MCAI    Human papilloma virus infection    pap negative    Hypercholesterolemia    Hyperlipidemia    Multiple allergies    Pap smear for cervical cancer screening    Parotitis    Sexually transmitted disease      Past Surgical History:   Procedure Laterality Date    Colposcopy, cervix w/upper adjacent vagina; w/biopsy(s), cervix      Labial biopsy - jar(s): 2      normal    Laser surgery of cervix        2005, 2008    Oral surgery  2019    Other surgical history  10/2014    oral implant     [place] teeth removed        Family History   Problem Relation Age of Onset    Heart Attack Father 38    Heart Disease Father     Heart Disorder Father         massive heart attack 38    Other (tobacco) Father     Other (osteopenia) Mother     No Known Problems Daughter     No Known Problems Son     No Known Problems Maternal Grandmother     No Known Problems Maternal Grandfather     No Known Problems Paternal Grandmother     No Known Problems Paternal Grandfather     Heart Disorder Brother     Hypertension Brother     Lipids Brother     Other (tobacco) Brother     Prostate Cancer Neg     Breast Cancer Neg     Colon Cancer Neg     Ovarian Cancer Neg     Uterine Cancer Neg     Pancreatic Cancer Neg       Social History:   Social History     Socioeconomic History    Marital status:    Tobacco Use    Smoking status: Never     Passive exposure: Never    Smokeless tobacco: Never   Vaping Use    Vaping status: Never Used   Substance and Sexual Activity    Alcohol use: Yes     Comment: occasional    Drug use: No    Sexual activity: Yes     Partners: Male     Birth control/protection: Pill, OCP     Comment: continously takes pill    Other Topics Concern    Caffeine Concern Yes     Comment: no concern, I drink coffee    Stress Concern No    Weight Concern Yes     Comment: I would like to lose weight     Special Diet No    Exercise Yes     Comment: I exercise typically 5-6 days a week    Seat Belt Yes     Comment: I wear my seatbelt, no question     Social Determinants of Health      [facility]    Social Connections    [facility]    Housing Stability        REVIEW OF SYSTEMS:   GENERAL: feels well otherwise  SKIN: no complaint of any unusual skin lesions  EYES: no complaint of blurred vision or double vision  HEENT: no complaint of nasal congestion, sinus pain or ST  LUNGS: no complaint of shortness of breath with exertion  CARDIOVASCULAR: no complaint of chest pain on exertion  GI: no complaint of pain, denies heartburn  : no complaints of vaginal discharge or urinary complaints  MUSCULOSKELETAL: no complaint of back pain  NEURO: no complaint of headaches  PSYCHE: no complaint of depression or anxiety  HEMATOLOGIC: denies hx of anemia    EXAM:   /78 (BP Location: Left arm, Patient Position: Sitting, Cuff Size: adult)   Pulse 83   Temp 97.6 °F (36.4 °C) (Temporal)   Resp 14   Ht 4' 11\" (1.499 m)   Wt 155 lb 1.6 oz (70.4 kg)   LMP 02/06/2024 (Approximate)   SpO2 96%   BMI 31.33 kg/m²   Body mass index is 31.33 kg/m².   GENERAL: well developed, well nourished,in no apparent distress  SKIN: no skin lesions  HEENT: atraumatic, normocephalic, ears are clear  EYES:PERRLA, EOMI, conjunctiva are clear  NECK: supple,no adenopathy, no thyroid masses.  BREAST:DEFERRED TO GYNE  LUNGS: clear to auscultation; no rhonchi, rales, or wheezing  CARDIO: RRR without murmur  GI: obese, no tenderness  :DEFERRED TO GYNE   MUSCULOSKELETAL: No obvious joint deformity or swelling.  Normal gait.  EXTREMITIES: no edema  NEURO: Oriented times three,cranial nerves are grossly intact,motor and sensory are grossly intact    LABS:     Lab Results   Component Value Date    WBC 6.0 01/03/2024    RBC  4.56 01/03/2024    HGB 12.5 01/03/2024    HCT 40.5 01/03/2024    MCV 88.8 01/03/2024    MCH 27.4 01/03/2024    MCHC 30.9 (L) 01/03/2024    RDW 14.3 01/03/2024    .0 01/03/2024      Lab Results   Component Value Date    GLU 87 10/25/2023    BUN 16 10/25/2023    BUNCREA 17.2 01/25/2020    CREATSERUM 0.68 10/25/2023    ANIONGAP 4 01/25/2020    GFR 92 11/02/2020    GFRNAA 105 10/25/2023    GFRAA 92 01/25/2020    CA 9.0 10/25/2023    OSMOCALC 294 01/25/2020    ALKPHO 56 01/25/2020    AST 20 10/25/2023    ALT 21 10/25/2023    BILT 0.2 10/26/2022    BILT 0.2 10/26/2022    TP 6.5 10/25/2023    ALB 3.3 (L) 01/25/2020    GLOBULIN 3.6 01/25/2020    AGRATIO 1.4 12/29/2015     01/25/2020    K 4.2 10/25/2023     10/25/2023    CO2 27.0 01/25/2020      Lab Results   Component Value Date    CHOLEST 151 10/25/2023    TRIG 68 10/25/2023    HDL 52 10/25/2023     (H) 01/25/2020    VLDL 12 01/25/2020    TCHDLRATIO 3.20 10/26/2022    NONHDLC 99 10/25/2023      Lab Results   Component Value Date    T4F 1.3 01/25/2018    TSH 2.000 02/06/2023    TSHT4 1.94 12/29/2015      Lab Results   Component Value Date     01/25/2020    A1C 5.2 01/25/2020         ASSESSMENT AND PLAN:   1. Encounter for annual physical exam  - Connie Bridges is a 52 year old female who presents for a complete physical exam.  Pap UTD and pelvic deferred to gyne. Mammo to be ordered by gyne. Encouraged self breast exams. Reviewed diet and exercise.   Pt' s weight is Body mass index is 32.06 kg/m². Recommended regular exercise. Labs reviewed. Vaccines reviewed. Colon screening ordered.    2. Coronary artery disease involving native coronary artery of native heart without angina pectoris  3. Bilateral carotid artery disease, unspecified type (HCC)  4. Hypercholesterolemia  - continue statin and asa  - low fat diet and exercise  - cards referral given  - simvastatin 20 MG Oral Tab; Take 1 tablet (20 mg total) by mouth nightly.  Dispense: 90  tablet; Refill: 3  - Cardio Referral - Internal    5. Hyperphosphatemia  - repeat level ordered  - Phosphorus [E]; Future    6. Right hand pain  - has right hand pain.  - get xray  - see ortho  - topical meds, ice  - XR Hand right (Min 3 views) - [facility] Orth[facility] Consult Only; Future  - ORTHOPEDIC - INTERNAL    7. Screening for colon cancer  - GASTRO - INTERNAL     Follow up in 1 year or sooner as needed  The patient indicates understanding of these issues and agrees to the plan.

## 2024-04-16 RX ORDER — SIMVASTATIN 20 MG
20 TABLET ORAL NIGHTLY
Qty: 90 TABLET | Refills: 3 | OUTPATIENT
Start: 2024-04-16

## 2024-04-20 ENCOUNTER — LAB ENCOUNTER (OUTPATIENT)
Dept: LAB | Age: 54
End: 2024-04-20
Attending: NURSE PRACTITIONER
Payer: COMMERCIAL

## 2024-04-20 DIAGNOSIS — R93.6 ABNORMAL X-RAY OF HAND: ICD-10-CM

## 2024-04-20 DIAGNOSIS — E83.39 HYPERPHOSPHATEMIA: ICD-10-CM

## 2024-04-20 DIAGNOSIS — M79.641 RIGHT HAND PAIN: ICD-10-CM

## 2024-04-20 LAB
CRP SERPL-MCNC: <0.29 MG/DL (ref ?–0.3)
ERYTHROCYTE [SEDIMENTATION RATE] IN BLOOD: 22 MM/HR
PHOSPHATE SERPL-MCNC: 4.1 MG/DL (ref 2.5–4.9)
RHEUMATOID FACT SERPL-ACNC: <10 IU/ML (ref ?–15)

## 2024-04-20 PROCEDURE — 84100 ASSAY OF PHOSPHORUS: CPT

## 2024-04-20 PROCEDURE — 86200 CCP ANTIBODY: CPT

## 2024-04-20 PROCEDURE — 86140 C-REACTIVE PROTEIN: CPT

## 2024-04-20 PROCEDURE — 86431 RHEUMATOID FACTOR QUANT: CPT

## 2024-04-20 PROCEDURE — 86225 DNA ANTIBODY NATIVE: CPT

## 2024-04-20 PROCEDURE — 85652 RBC SED RATE AUTOMATED: CPT

## 2024-04-20 PROCEDURE — 86038 ANTINUCLEAR ANTIBODIES: CPT

## 2024-04-24 LAB
CCP IGG SERPL-ACNC: 1.7 U/ML (ref 0–6.9)
DSDNA IGG SERPL IA-ACNC: 1.2 IU/ML
ENA AB SER QL IA: 0.4 UG/L
ENA AB SER QL IA: NEGATIVE

## 2024-04-26 ENCOUNTER — OFFICE VISIT (OUTPATIENT)
Dept: ORTHOPEDICS CLINIC | Facility: CLINIC | Age: 54
End: 2024-04-26
Payer: COMMERCIAL

## 2024-04-26 VITALS — HEIGHT: 59 IN | BODY MASS INDEX: 30.84 KG/M2 | WEIGHT: 153 LBS

## 2024-04-26 DIAGNOSIS — M18.11 ARTHRITIS OF CARPOMETACARPAL (CMC) JOINT OF RIGHT THUMB: Primary | ICD-10-CM

## 2024-04-26 PROCEDURE — 3008F BODY MASS INDEX DOCD: CPT | Performed by: FAMILY MEDICINE

## 2024-04-26 PROCEDURE — 99204 OFFICE O/P NEW MOD 45 MIN: CPT | Performed by: FAMILY MEDICINE

## 2024-04-26 NOTE — H&P
Sports Medicine Clinic Note     Subjective:    Chief Complaint   Patient presents with    Hand Pain     Rt hand pain  onset: 6 months ago  - pain and swelling is located at the base of the thumb  - no previous injury  - dominant hand: rt hand   - types for work      History of Present Illness: This is a pleasant 53 year old female who presents with persistent right thumb pain, more prominent at the base. Reports difficulty in gripping and pinching objects, especially in the morning. Pain worsens with activities like opening jars or writing. No reported numbness or tingling. No history of trauma. Previous conservative treatments, including NSAIDs and chiropractic adjustments of the thumb provided moderate relief.  She is a  which involves a lot of typing and she also enjoys various forms of exercises like lifting weights and isometric movements that sometimes put stress on her right thumb.     Objective:     Right Thumb Examination:     Inspection: Mild thumb base swelling. No erythema or deformity.  Palpation: Tenderness at the thumb CMC joint. No crepitus.  Active/Passive ROM: Limited abduction and opposition of the thumb due to pain. Flexion and extension mildly restricted.  Neurovascular: Sensation intact in all digits. Radial pulse palpable and symmetric.  Special Tests: Positive grind test in the thumb, eliciting pain and crepitus.  Negative Finkelstein's.     Imaging:     Radiographs of the affected right wrist were personally reviewed. Findings suggestive of thumb CMC arthritis with joint space narrowing. No fracture or dislocation noted.     Assessment:     Right thumb carpometacarpal arthritis     Plan:     The patient is advised to continue with conservative management. An order for thumb push metagrip brace is given for pain relief and joint support. A prescription for topical Diclofenac gel 1% is provided, to be applied to the affected area four times daily. Referral to occupational  therapy for hand-specific exercises and modalities aimed at reducing pain and improving function. Discussed initial interventions including corticosteroid injection which we deferred for the time being. The patient is advised to limit activities that exacerbate symptoms and to use assistive devices for daily tasks. Follow-up appointment scheduled in 4-6 weeks to reassess symptoms and treatment efficacy, sooner as needed.     Landon Gray DO, PARAS   Primary Care Sports Medicine    Department of Orthopaedic Surgery  [address] [address]   [address] [place] [id number]    t: [phone]  f: [phone]      [facility]

## 2024-05-17 ENCOUNTER — TELEPHONE (OUTPATIENT)
Dept: PHYSICAL THERAPY | Facility: HOSPITAL | Age: 54
End: 2024-05-17

## 2024-05-17 NOTE — PROGRESS NOTES
OCCUPATIONAL THERAPY UPPER EXTREMITY EVALUATION   Referring Provider: Dr. Gray  Diagnosis:      Arthritis of carpometacarpal (CMC) joint of right thumb (M18.11)    Orders:   Order Date:  4/26/2024  Authorizing Provider:   ELIZABETH GRAY     Procedure:  OP REFERRAL TO [facility] [[id number]]         Order #:   [id number]  Qty:  1     Priority:  Routine                   Class:   IHP - RFL     Standing Interval:          Standing Occurrences:          Expires on:            Expected by:    Associated DX:  Arthritis of carpometacarpal (CMC) joint of right thumb (M18.11)     Order summary:  IHP - RFL, Routine, 16 visits  Occupational  Therapy Area of Concentration: Orthopedic  Occupational Therapy Ortho: UE  If the patient can be seen sooner with a PT vs an OT, can we cancel this order and replace with a PT order? Yes         Comments:  The occupational therapy protocol will focus on pain management, improving thumb strength, and maintaining functional range of motion. Therapy sessions will begin with gentle warm-up exercises, such as thumb and wrist circles, to increase blood flow to the area. This will be followed by a series of range of motion exercises targeting the thumb and wrist, including thumb abduction, adduction, flexion, extension, and opposition exercises. Emphasis will be placed on isometric strengthening exercises for the thenar and hypothenar muscles, progressing to resistive band exercises as tolerated. Modalities like ultrasound or heat therapy may be used for pain management. The therapist will also instruct the patient in joint protection strategies and ergonomic modifications for daily activities, emphasizing the use of larger joints and avoiding pinch . Sessions will conclude with ice application to reduce any post-exercise inflammation. The patient will be encouraged to perform a home exercise program, comprising the exercises taught during the sessions, to be done daily.  Regular progress assessments will be made, and the program will be adjusted based on the patient's response to therapy.      SUBJECTIVE:  Connie Bridges is a 53 year old female who presents to therapy today with complaints of right thumb pain.  Pt describes pain level current 2-3/10, at best 2-3/10, at worst 7/10.   Current functional limitations include pinching, gripping computer, planking, downward dog.    Connie describes prior level of function independent/no limitations.  Employment: Working full duty  Hand Dominance: right  Living Situation:  unknown  HPI: progressive pain in thumb  Imaging/Tests: xrays  Pt goals include relief of pain.  Past medical history was reviewed with Connie. Significant findings:   Past Medical History:    Abnormal ultrasound of breast    ABUS:no sonographic evidence of malignancy in either breast    Allergic rhinitis    Amenorrhea    on birth control    Ankle sprain    Arthritis    Wondering how I can handle discomfort and weakness    Bilateral breast cysts    Ultrasound of both breast negative for malignancy    Breast cancer screening other than mammogram    ABUS negative    Breast cancer screening other than mammogram    Ultrasound: RIGHT BREAST: No suspicious solid or cystic masses are seen. LEFT BREAST: No suspicious solid or cystic masses are seen.    Colon cancer screening    Cologuard = Negative    Genital warts    H/O mammogram    negative     H/O mammogram    1 year screening mammogram is recommended. Given the patient's breast density, screening automated breast ultrasound should be considered as a supplement to the patient's annual mammogram.    History of biopsy    vulva biopsy - negative for dysplasia,viral effect, and malignancy    History of mammogram    benign-MCAI    Human papilloma virus infection    pap negative    Hypercholesterolemia    Hyperlipidemia    Multiple allergies    Pap smear for cervical cancer screening    Parotitis    Sexually transmitted disease            Precautions: None    OBJECTIVE:    OBSERVATION: Unremarkable       OUTCOME MEASURE   On Initial Evaluation:  QuickDASH Outcome Score  Score: 27.27 % (5/18/2024  9:59 AM)      ORTHOTICS: [place]  push splint        SENSORY: WNL        UE AROM (°) : Right /Left grossly WFL         Thumb AROM (°)  Right/Left Right  5/22/24 Left   5/22/24   Thumb MP Ext/Flex +15/65       +10/60   Thumb IP Ext/Flex 0/70        0/70   Palmar Abduction 45             45   Radial Abduction 50             50   Opposition (Kapandji score= 0 no opp, 10 maximal opposition) 10            10      Strength (lbs)  Right/Left Right  5/22/24 Left   5/22/24    48 50   3 Point Pinch  11 (2-3/10 pain) 10   Lateral Pinch 10 10           Occupational profile: history and experiences, patterns of daily living, interests, values and needs:    Patient's expressed concerns about performing occupations and daily life on initial eval:     Occupational roles affected by referring diagnosis on initial eval:   Student: N/a    Homemaker:  limited   Worker: lifting computer, writing:  limited   Leisure: Yoga, working out: limited   Cook/: limited       ASSESSMENT  Connie presents to occupational therapy evaluation with primary c/o right thumb pain. The results of the objective tests and measures show the physical, cognitive and/or psychosocial skills affected in the summary below, including specifically of note decreased ROM and pain in right hand/thumb.  Functional deficits include but are not limited to gripping, pinching, weight bearing.  Signs and symptoms are consistent with diagnosis of OA. Patient and OT discussed evaluation findings, pathology, POC and HEP.  Patient voiced understanding and performs HEP correctly without reported pain. Skilled Occupational Therapy is medically necessary to address the above impairments and reach functional goals.     Occupations as identified above (representing ADL's and IADL's, Education,  Work, Leisure, and Social Participation) and the following component areas:    ---Physical skills affected by referring diagnosis: Pain, Decreased range of motion, Fine motor coordination,Decreased strength, Decreased endurance in the right hand.    ---Cognitive skills affected by referring diagnosis: None    ---Psychosocial skills affected by referring diagnosis:  Interpersonal interactions, Habits, Routines, Use of coping strategies.    The Occupational Therapy Evaluation code of 60669 Low Complex was selected based on the followin. Chart Review: A brief chart review indicating low complexity was performed.  Occupational profile: the following were assessed: presenting problems, reasons for referral, occupational history, patterns of daily living, interests, values, needs, client's goals/concerns about performing occupations and daily life skills.  Medical and therapy history review: PLF identified, review of medical records.    2. Assessment of Occupational Performance: (see above summary of performance deficits identified; levels of assessment used)  moderate complexity (3-5 performance deficits relating to physical, cognitive, or psychosocial skills).    3. Clinical decision-making: includes the assessment process, comorbidities (additional diseases/conditions/disorders, socioeconomic, cultural, environmental, client behavior characteristics) such as osteopenia , the assessment of modification and need for assistance such as none noted, selection of interventions such as Manual Therapy, Neuromuscular Re-education, Self-Care Home Management, Therapeutic Activities, Therapeutic Exercise, Home Exercise Program instruction, Modalities to include: Ultrasound and paraffin, hot packs, FT, and taping  and custom splinting, plan of care (intervention strategies, specialized skills, outcome goals), indicating a low complexity: analysis of data from problem-focused assessment.    These deficits impair the  patient's ability to participate in ADLs, IADLs, and meaningful occupational tasks.  Reduced participation in meaningful occupational tasks may increase feelings of sadness and isolation.      Today’s Treatment and Response:   Treatment provided today in addition to the initial evaluation:           Date 5/22/24       Visit # 1       # of visits authorized:       # of visits in OT POC:  4     Evaluation Initial X     Progress Report written        Manual Therapy                                                 Ther ex          thenar muscle isometrics x        first DI isometrics x       AROM  thumb x        passive stretches to first web space x                                                    HEP/  Patient education topics See HEP notes below       Therapeutic Activity        Joint protection hand out given                                         Self care training       Objective measurements taken and reviewed with patient  See above       Neuromuscular Re-education                                   Orthotic fitting and training Adjusted PUSH Metagrip splint per protocol     Taping Rock tape applied in thumb CMC and MP support     Modalities Hot pack 5 minutes     X= performed this date as previously outlined    HEP  On initial eval, patient education was provided on exam findings, treatment diagnosis, treatment plan, expectations, and prognosis. Patient was also provided recommendations for use of heat, to purchase tape    HEP Date Issued Date modified Date discharged Comments    Thumb CMC: isometrics, passive stretches, clip stretches 5/22/24                                      Goals: (to be met in 4 visits)  Patient will be independent and compliant with comprehensive HEP to maintain progress achieved in OT.  Patient will verbalize two concepts of joint protection for independent self management and ADL/IADL performance.  Patient will participate in trial of thumb splints.  Patient will demonstrate ability  to tape self for support and relief of pain.    PLAN:  Frequency / Duration: Patient will be seen for 1-2 x/week or a total of 4 visits over a 90 day period.  Treatment will include: Manual Therapy, Neuromuscular Re-education, Self-Care Home Management, Therapeutic Activities, Therapeutic Exercise, Home Exercise Program instruction, Modalities to include: Ultrasound and hot packs, paraffin, FT, and taping  and custom splinting.    Next session: joint protection instruction, review other splint options, balanced thumb training    Education or treatment limitation: None  Rehab Potential:good    Patient was advised of these findings, precautions, and treatment options and has agreed to actively participate in planning and for this course of care.    Charges: OT Eval: Low Complexity, 1 TE  Total Timed Treatment: 15 minutes     Total Treatment Time: 45 minutes      ANGEL Katz/L S T  Physician's certification required: Yes  I certify the need for these services furnished under this plan of treatment and while under my care. (electronic signature)    X___________________________________________________ Date____________________    Certification From: 5/22/2024  To:8/20/2024

## 2024-05-22 ENCOUNTER — OFFICE VISIT (OUTPATIENT)
Dept: OCCUPATIONAL MEDICINE | Age: 54
End: 2024-05-22
Attending: FAMILY MEDICINE

## 2024-05-22 DIAGNOSIS — M18.11 ARTHRITIS OF CARPOMETACARPAL (CMC) JOINT OF RIGHT THUMB: Primary | ICD-10-CM

## 2024-05-22 PROCEDURE — 97165 OT EVAL LOW COMPLEX 30 MIN: CPT

## 2024-05-22 PROCEDURE — 97110 THERAPEUTIC EXERCISES: CPT

## 2024-05-23 NOTE — PROGRESS NOTES
Occupational Therapy Daily Note    Diagnosis:        Arthritis of carpometacarpal (CMC) joint of right thumb (M18.11) Referring Provider: Isaac  Date of Evaluation:    5/22/24    Precautions:  None  Next MD/PA visit: prn  DOI: chronic  Date of Surgery: n/a   Insurance Primary/Secondary: [employer] [place] PPO / N/A     # Auth Visits: n/a            Orders:   Order Date:  4/26/2024  Authorizing Provider:   ELIZABETH FORD     Procedure:  OP REFERRAL TO [facility] [[id number]]         Order #:   [id number]  Qty:  1     Priority:  Routine                   Class:   IHP - RFL     Standing Interval:          Standing Occurrences:          Expires on:            Expected by:    Associated DX:  Arthritis of carpometacarpal (CMC) joint of right thumb (M18.11)     Order summary:  IHP - RFL, Routine, 16 visits  Occupational  Therapy Area of Concentration: Orthopedic  Occupational Therapy Ortho: UE  If the patient can be seen sooner with a PT vs an OT, can we cancel this order and replace with a PT order? Yes         Comments:  The occupational therapy protocol will focus on pain management, improving thumb strength, and maintaining functional range of motion. Therapy sessions will begin with gentle warm-up exercises, such as thumb and wrist circles, to increase blood flow to the area. This will be followed by a series of range of motion exercises targeting the thumb and wrist, including thumb abduction, adduction, flexion, extension, and opposition exercises. Emphasis will be placed on isometric strengthening exercises for the thenar and hypothenar muscles, progressing to resistive band exercises as tolerated. Modalities like ultrasound or heat therapy may be used for pain management. The therapist will also instruct the patient in joint protection strategies and ergonomic modifications for daily activities, emphasizing the use of larger joints and avoiding pinch . Sessions will conclude with ice application to  reduce any post-exercise inflammation. The patient will be encouraged to perform a home exercise program, comprising the exercises taught during the sessions, to be done daily. Regular progress assessments will be made, and the program will be adjusted based on the patient's response to therapy.      SUBJECTIVE:  Reports trying to do HEP but not having much time since last seen.      Pt describes pain level current 3/10 today.    OBJECTIVE:    OBSERVATION: Unremarkable       OUTCOME MEASURE   On Initial Evaluation:  QuickDASH Outcome Score  Score: 27.27 % (5/18/2024  9:59 AM)        Thumb AROM (°)  Right/Left Right  5/22/24 Left   5/22/24   Thumb MP Ext/Flex +15/65       +10/60   Thumb IP Ext/Flex 0/70        0/70   Palmar Abduction 45             45   Radial Abduction 50             50   Opposition (Kapandji score= 0 no opp, 10 maximal opposition) 10            10      Strength (lbs)  Right/Left Right  5/22/24 Left   5/22/24    48 50   3 Point Pinch  11 (2-3/10 pain) 10   Lateral Pinch 10 10                 Date 5/22/24 5/29/24     Visit # 1  2     # of visits authorized:       # of visits in OT POC:  4 4    Evaluation Initial X     Progress Report written        Manual Therapy                                                 Ther ex          thenar muscle isometrics x  x      first DI isometrics x  x     AROM  thumb x  x      passive stretches to first web space x  x                                                  HEP/  Patient education topics See HEP notes below       Therapeutic Activity        Joint protection hand out given Instruction and training completed                                        Self care training       Objective measurements taken and reviewed with patient  See above       Neuromuscular Re-education                                   Orthotic fitting and training Adjusted PUSH Metagrip splint per protocol Options: 3PP thumb prima and comfort cool splint/braces reviewed    Taping  Rock tape applied in thumb CMC and MP support Deferred due to irritation from PT at work having taped her    Modalities Hot pack 5 minutes Hot pack 5 minutes    X= performed this date as previously outlined    HEP  On initial eval, patient education was provided on exam findings, treatment diagnosis, treatment plan, expectations, and prognosis. Patient was also provided recommendations for use of heat, to purchase tape    HEP Date Issued Date modified Date discharged Comments    Thumb CMC: isometrics, passive stretches, clip stretches 5/22/24                                    ASSESSMENT  Stable presentation of thumb first CMC OA.  Is receptive to instruction and training. Unable to tape or use paraffin due to skin integrity, however.     Goals: (to be met in 4 visits)  Patient will be independent and compliant with comprehensive HEP to maintain progress achieved in OT.  Patient will verbalize two concepts of joint protection for independent self management and ADL/IADL performance.  Patient will participate in trial of thumb splints.  Patient will demonstrate ability to tape self for support and relief of pain.    PLAN:  Frequency / Duration: Patient will be seen for 1-2 x/week or a total of 4 visits over a 90 day period.  Treatment will include: Manual Therapy, Neuromuscular Re-education, Self-Care Home Management, Therapeutic Activities, Therapeutic Exercise, Home Exercise Program instruction, Modalities to include: Ultrasound and hot packs, paraffin, FT, and taping  and custom splinting.    Next session: consider paraffin if skin is intact; FT, thumb strengthening  Charges:   2 TA, 1 TE   Total Timed Treatment: 40 minutes    Total Treatment Time: 45 minutes  KIMO Katz, OTR/L, CHT

## 2024-05-29 ENCOUNTER — OFFICE VISIT (OUTPATIENT)
Dept: OCCUPATIONAL MEDICINE | Age: 54
End: 2024-05-29
Attending: FAMILY MEDICINE

## 2024-05-29 PROCEDURE — 97110 THERAPEUTIC EXERCISES: CPT

## 2024-05-29 PROCEDURE — 97530 THERAPEUTIC ACTIVITIES: CPT

## 2024-06-03 NOTE — PROGRESS NOTES
Occupational Therapy Daily Note    Diagnosis:        Arthritis of carpometacarpal (CMC) joint of right thumb (M18.11) Referring Provider: Isaac  Date of Evaluation:    5/22/24    Precautions:  None  Next MD/PA visit: prn  DOI: chronic  Date of Surgery: n/a   Insurance Primary/Secondary: [employer] [place] PPO / N/A     # Auth Visits: n/a            Orders:   Order Date:  4/26/2024  Authorizing Provider:   ELIZABETH FORD     Procedure:  OP REFERRAL TO [facility] [[id number]]         Order #:   [id number]  Qty:  1     Priority:  Routine                   Class:   IHP - RFL     Standing Interval:          Standing Occurrences:          Expires on:            Expected by:    Associated DX:  Arthritis of carpometacarpal (CMC) joint of right thumb (M18.11)     Order summary:  IHP - RFL, Routine, 16 visits  Occupational  Therapy Area of Concentration: Orthopedic  Occupational Therapy Ortho: UE  If the patient can be seen sooner with a PT vs an OT, can we cancel this order and replace with a PT order? Yes         Comments:  The occupational therapy protocol will focus on pain management, improving thumb strength, and maintaining functional range of motion. Therapy sessions will begin with gentle warm-up exercises, such as thumb and wrist circles, to increase blood flow to the area. This will be followed by a series of range of motion exercises targeting the thumb and wrist, including thumb abduction, adduction, flexion, extension, and opposition exercises. Emphasis will be placed on isometric strengthening exercises for the thenar and hypothenar muscles, progressing to resistive band exercises as tolerated. Modalities like ultrasound or heat therapy may be used for pain management. The therapist will also instruct the patient in joint protection strategies and ergonomic modifications for daily activities, emphasizing the use of larger joints and avoiding pinch . Sessions will conclude with ice application to  reduce any post-exercise inflammation. The patient will be encouraged to perform a home exercise program, comprising the exercises taught during the sessions, to be done daily. Regular progress assessments will be made, and the program will be adjusted based on the patient's response to therapy.      SUBJECTIVE:  \"I don't feel the dull ache all of the time since wearing the brace.\"      Pain: 3/10     OBJECTIVE:    OBSERVATION: Unremarkable       OUTCOME MEASURE   On Initial Evaluation:  QuickDASH Outcome Score  Score: 27.27 % (5/18/2024  9:59 AM)        Thumb AROM (°)  Right/Left Right  5/22/24 Left   5/22/24   Thumb MP Ext/Flex +15/65       +10/60   Thumb IP Ext/Flex 0/70        0/70   Palmar Abduction 45             45   Radial Abduction 50             50   Opposition (Kapandji score= 0 no opp, 10 maximal opposition) 10            10      Strength (lbs)  Right/Left Right  5/22/24 Left   5/22/24    48 50   3 Point Pinch  11 (2-3/10 pain) 10   Lateral Pinch 10 10                 Date 5/22/24 5/29/24 6/6/24   Visit # 1  2  3   # of visits authorized:       # of visits in OT POC:  4 4 4   Evaluation Initial X     Progress Report written        Manual Therapy                                                 Ther ex          thenar muscle isometrics x  x  x    first DI isometrics x  x  x   AROM  thumb x  x  x    passive stretches to first web space x  x  chip clip 1 minute    three point pinch     With yellow soft putty                                      HEP/  Patient education topics See HEP notes below    anatomy of injury; issued yellow soft putty; Oval 8 size 7 for IF PIPJ hyperextension   Therapeutic Activity        Joint protection hand out given Instruction and training completed Reports application of concepts                                       Self care training       Objective measurements taken and reviewed with patient  See above       Neuromuscular Re-education                                    Orthotic fitting and training Adjusted PUSH Metagrip splint per protocol Options: 3PP thumb prima and comfort cool splint/braces reviewed Fitted with 3PP Thumb Prima splint; fabricated custom figure 8 orthosis to control hyperextension at RIF PIPJ   Taping Rock tape applied in thumb CMC and MP support Deferred due to irritation from PT at work having taped her    Modalities Hot pack 5 minutes Hot pack 5 minutes Hot pack 5 minutes   X= performed this date as previously outlined    HEP  On initial eval, patient education was provided on exam findings, treatment diagnosis, treatment plan, expectations, and prognosis. Patient was also provided recommendations for use of heat, to purchase tape    HEP Date Issued Date modified Date discharged Comments    Thumb CMC: isometrics, passive stretches, clip stretches 5/22/24                                    ASSESSMENT  Patient does tend to present with hypermobility at the PIPJs of the digits allowing for active \"swan-necking\".  The figure 8 orthosis is controlling the abnormal posturing.  Thumb first web space is tight.    Goals: (to be met in 4 visits)  Patient will be independent and compliant with comprehensive HEP to maintain progress achieved in OT. (Progressing)  Patient will verbalize two concepts of joint protection for independent self management and ADL/IADL performance. (Met)  Patient will participate in trial of thumb splints.(Met)  Patient will demonstrate ability to tape self for support and relief of pain. (Progressing)    PLAN:  Frequency / Duration: Patient will be seen for 1-2 x/week or a total of 4 visits over a 90 day period.  Treatment will include: Manual Therapy, Neuromuscular Re-education, Self-Care Home Management, Therapeutic Activities, Therapeutic Exercise, Home Exercise Program instruction, Modalities to include: Ultrasound and hot packs, paraffin, FT, and taping  and custom splinting.    Next session: consider paraffin if skin is intact; FT,  thumb strengthening; adjust oval 8 splints prn  Charges:   1 Orthotic fitting and training, 2 TE  Total Timed Treatment: 40 minutes    Total Treatment Time: 45 minutes  KIMO Katz, OTR/L, CHT

## 2024-06-06 ENCOUNTER — OFFICE VISIT (OUTPATIENT)
Dept: OCCUPATIONAL MEDICINE | Age: 54
End: 2024-06-06
Attending: FAMILY MEDICINE
Payer: COMMERCIAL

## 2024-06-06 PROCEDURE — 97110 THERAPEUTIC EXERCISES: CPT

## 2024-06-06 PROCEDURE — 97760 ORTHOTIC MGMT&TRAING 1ST ENC: CPT

## 2024-06-10 NOTE — PROGRESS NOTES
Zonia  Pt has attended 4 visits in Occupational Therapy.       Diagnosis:        Arthritis of carpometacarpal (CMC) joint of right thumb (M18.11) Referring Provider: Isaac  Date of Evaluation:    5/22/24    Precautions:  None  Next MD/PA visit: prn  DOI: chronic  Date of Surgery: n/a   Insurance Primary/Secondary: [employer] [place] P[place] / N/A     # Auth Visits: n/a            Orders:   Order Date:  4/26/2024  Authorizing Provider:   ELIZABETH FORD     Procedure:  OP REFERRAL TO [facility] [[id number]]         Order #:   [id number]  Qty:  1     Priority:  Routine                   Class:   IHP - RFL     Standing Interval:          Standing Occurrences:          Expires on:            Expected by:    Associated DX:  Arthritis of carpometacarpal (CMC) joint of right thumb (M18.11)     Order summary:  IHP - RFL, Routine, 16 visits  Occupational  Therapy Area of Concentration: Orthopedic  Occupational Therapy Ortho: UE  If the patient can be seen sooner with a PT vs an OT, can we cancel this order and replace with a PT order? Yes         Comments:  The occupational therapy protocol will focus on pain management, improving thumb strength, and maintaining functional range of motion. Therapy sessions will begin with gentle warm-up exercises, such as thumb and wrist circles, to increase blood flow to the area. This will be followed by a series of range of motion exercises targeting the thumb and wrist, including thumb abduction, adduction, flexion, extension, and opposition exercises. Emphasis will be placed on isometric strengthening exercises for the thenar and hypothenar muscles, progressing to resistive band exercises as tolerated. Modalities like ultrasound or heat therapy may be used for pain management. The therapist will also instruct the patient in joint protection strategies and ergonomic modifications for daily activities, emphasizing the use of larger joints and avoiding pinch . Sessions  will conclude with ice application to reduce any post-exercise inflammation. The patient will be encouraged to perform a home exercise program, comprising the exercises taught during the sessions, to be done daily. Regular progress assessments will be made, and the program will be adjusted based on the patient's response to therapy.      SUBJECTIVE:  \"I didn't wear my brace yesterday.  It's been a few days since I put on the Voltaren.\"      Pain: 2/10     OBJECTIVE:      OUTCOME MEASURE   On Initial Evaluation:  QuickDASH Outcome Score  Score: 27.27 % (5/18/2024  9:59 AM)      Discharge  Post QuickDASH Outcome Score  Post Score: 11.36 % (6/13/2024  8:48 AM)  15.91 % improvement      Thumb AROM (°)  Right/Left Right  5/22/24 Left   5/22/24   Thumb MP Ext/Flex +15/65       +10/60   Thumb IP Ext/Flex 0/70        0/70   Palmar Abduction 45             45   Radial Abduction 50             50   Opposition (Kapandji score= 0 no opp, 10 maximal opposition) 10            10      Strength (lbs)  Right/Left Right  5/22/24 Left   5/22/24    48 50   3 Point Pinch  11 (2-3/10 pain) 10   Lateral Pinch 10 10                 Date 5/22/24 5/29/24 6/6/24 6/13/24   Visit # 1  2  3 4   # of visits authorized:        # of visits in OT POC:  4 4 4 4   Evaluation Initial X      Progress Report written         Manual Therapy                                                       Ther ex           thenar muscle isometrics x  x  x x    first DI isometrics x  x  x With putty   AROM  thumb x  x  x     passive stretches to first web space x  x  chip clip 1 minute Chip clip    three point pinch     With yellow soft putty With mild clip    thumb positioning/proprioceptive       Proprio stick                               HEP/  Patient education topics See HEP notes below    anatomy of injury; issued yellow soft putty; Oval 8 size 7 for IF PIPJ hyperextension Fitted with Oval 8 splints for IF and RF   Therapeutic Activity         Joint  protection hand out given Instruction and training completed Reports application of concepts                                              Self care training        Objective measurements taken and reviewed with patient  See above        Neuromuscular Re-education                                        Orthotic fitting and training Adjusted PUSH Metagrip splint per protocol Options: 3PP thumb prima and comfort cool splint/braces reviewed Fitted with 3PP Thumb Prima splint; fabricated custom figure 8 orthosis to control hyperextension at RIF PIPJ Fitted Oval 8 splints to IF and RF   Taping Rock tape applied in thumb CMC and MP support Deferred due to irritation from PT at work having taped her  Rock tape applied to IF to assist DIP extension and also to thumb for CMC and MP support   Modalities Hot pack 5 minutes Hot pack 5 minutes Hot pack 5 minutes    X= performed this date as previously outlined    HEP  On initial eval, patient education was provided on exam findings, treatment diagnosis, treatment plan, expectations, and prognosis. Patient was also provided recommendations for use of heat, to purchase tape    HEP Date Issued Date modified Date discharged Comments    Thumb CMC: isometrics, passive stretches, clip stretches 5/22/24                                    ASSESSMENT  Patient is a 52 yo female, who has been seen in Occupational Therapy since initial eval on 5/22/24, with last treatment session on 6/13/24.  The patient has attended 4/4 scheduled appointments, with 0 no shows and 0 cancellations.  Focus of skilled OT has been on thumb strength and stability, with use of interventions including therapeutic activities, therapeutic exercises, modalities such as superficial heating, adapted ADL training, custom splinting, and taping to achieve the functional goals listed below. She has shown improvement in pain, with functional improvements reported as in opening containers and in daily occupation engagement.   Patient rated Outcome measure of the Quick DASH indicates improvement and a normal rating.  Limitations and barriers toward progress include: chronicity of diagnosis.  She has met the goals as noted below, reaching maximal benefit from skilled OT, and is ready for transition to home program at this time.      Goals: (to be met in 4 visits)  Patient will be independent and compliant with comprehensive HEP to maintain progress achieved in OT. (Met)  Patient will verbalize two concepts of joint protection for independent self management and ADL/IADL performance. (Met)  Patient will participate in trial of thumb splints.(Met)  Patient will demonstrate ability to tape self for support and relief of pain. (Met)    PLAN:  Discharge OT and continue with HEP and self management.      Charges:   1 Orthotic fitting and training, 2 TE  Total Timed Treatment: 40 minutes    Total Treatment Time: 45 minutes  KIMO Katz, OTR/L, CHT

## 2024-06-11 ENCOUNTER — APPOINTMENT (OUTPATIENT)
Dept: OCCUPATIONAL MEDICINE | Age: 54
End: 2024-06-11
Attending: FAMILY MEDICINE
Payer: COMMERCIAL

## 2024-06-13 ENCOUNTER — OFFICE VISIT (OUTPATIENT)
Dept: OCCUPATIONAL MEDICINE | Age: 54
End: 2024-06-13
Attending: FAMILY MEDICINE
Payer: COMMERCIAL

## 2024-06-13 PROCEDURE — 97760 ORTHOTIC MGMT&TRAING 1ST ENC: CPT

## 2024-06-13 PROCEDURE — 97110 THERAPEUTIC EXERCISES: CPT

## 2024-07-18 ENCOUNTER — OFFICE VISIT (OUTPATIENT)
Dept: ORTHOPEDICS CLINIC | Facility: CLINIC | Age: 54
End: 2024-07-18
Payer: COMMERCIAL

## 2024-07-18 VITALS — BODY MASS INDEX: 30.84 KG/M2 | WEIGHT: 153 LBS | HEIGHT: 59 IN

## 2024-07-18 DIAGNOSIS — M18.11 ARTHRITIS OF CARPOMETACARPAL (CMC) JOINT OF RIGHT THUMB: Primary | ICD-10-CM

## 2024-07-18 PROCEDURE — 3008F BODY MASS INDEX DOCD: CPT | Performed by: FAMILY MEDICINE

## 2024-07-18 PROCEDURE — 99213 OFFICE O/P EST LOW 20 MIN: CPT | Performed by: FAMILY MEDICINE

## 2024-07-18 NOTE — PROGRESS NOTES
Sports Medicine Clinic Note     Subjective:    Chief Complaint   Patient presents with    Follow - Up     Follow up right hand;  Worse or better - better      Interval History: This is a pleasant 53 year old female who presents for follow up regarding right thumb pain suspected due to CMC OA.  At last visit we discussed nonoperative interventions.  She deferred injection therapy but opted for trial of OT and push MetaGrip bracing.  Today she reports significant improvement in pain levels she is very happy with her current status although this past week she did have some mild increase in pain that is now improving.  She continues to defer further injection therapy.  No new injuries or complaints.     Objective:     Right Thumb Examination:     Inspection: No erythema or deformity.    Palpation: No significant TTP at the thumb CMC joint. No crepitus.    Active/Passive ROM: Full pain free ROM.    Neurovascular: Intact    Diagnostic Tests:     No new testing.     Assessment:     Right thumb carpometacarpal arthritis,improved     Plan:     We rereviewed her suspected diagnosis and conservative management strategies. She is overall happy with her progress and encouraged her to continue home exercises she learned through OT and continue using push MetaGrip splint for comfort and support. Topical Voltaren or p.o. anti-inflammatories can be used for analgesia as she has plenty of Voltaren at home and does not need a refill. At this stage can follow-up as needed for consideration of injection therapy if needed.      Landon Gray DO, NAVDEEPM   Primary Care Sports Medicine    Department of Orthopaedic Surgery  [place] [facility]    [address] [place] [address]  [address] [place] [id number]    t: [phone]  f: [phone]      [facility]

## 2024-07-29 ENCOUNTER — OFFICE VISIT (OUTPATIENT)
Facility: CLINIC | Age: 54
End: 2024-07-29
Payer: COMMERCIAL

## 2024-07-29 VITALS
WEIGHT: 155 LBS | BODY MASS INDEX: 31.25 KG/M2 | HEART RATE: 80 BPM | SYSTOLIC BLOOD PRESSURE: 132 MMHG | DIASTOLIC BLOOD PRESSURE: 82 MMHG | HEIGHT: 59 IN

## 2024-07-29 DIAGNOSIS — N39.41 URGENCY INCONTINENCE: ICD-10-CM

## 2024-07-29 DIAGNOSIS — Z12.4 PAPANICOLAOU SMEAR FOR CERVICAL CANCER SCREENING: ICD-10-CM

## 2024-07-29 DIAGNOSIS — Z12.31 ENCOUNTER FOR SCREENING MAMMOGRAM FOR BREAST CANCER: Primary | ICD-10-CM

## 2024-07-29 DIAGNOSIS — Z01.419 WELL WOMAN EXAM WITH ROUTINE GYNECOLOGICAL EXAM: ICD-10-CM

## 2024-07-29 PROCEDURE — 3079F DIAST BP 80-89 MM HG: CPT | Performed by: OBSTETRICS & GYNECOLOGY

## 2024-07-29 PROCEDURE — 3075F SYST BP GE 130 - 139MM HG: CPT | Performed by: OBSTETRICS & GYNECOLOGY

## 2024-07-29 PROCEDURE — 87624 HPV HI-RISK TYP POOLED RSLT: CPT | Performed by: OBSTETRICS & GYNECOLOGY

## 2024-07-29 PROCEDURE — 88175 CYTOPATH C/V AUTO FLUID REDO: CPT | Performed by: OBSTETRICS & GYNECOLOGY

## 2024-07-29 PROCEDURE — 99396 PREV VISIT EST AGE 40-64: CPT | Performed by: OBSTETRICS & GYNECOLOGY

## 2024-07-29 PROCEDURE — 3008F BODY MASS INDEX DOCD: CPT | Performed by: OBSTETRICS & GYNECOLOGY

## 2024-07-29 RX ORDER — TOLTERODINE TARTRATE 2 MG/1
2 TABLET, EXTENDED RELEASE ORAL DAILY
Qty: 90 TABLET | Refills: 5 | Status: SHIPPED | OUTPATIENT
Start: 2024-07-29

## 2024-07-29 NOTE — PROGRESS NOTES
Connie Bridges is a 53 year old female  Patient's last menstrual period was 2024 (exact date).   Chief Complaint   Patient presents with    Wellness Visit     No complaints   .     Her last period was in February she is having some mild hot flashes her laser to her cervix was in the 90s.  No vaginal itching or dryness.  She does have some urgency incontinence.  The Detrol is helping some.  She would like to do physical therapy for the pelvis.  Blood work is done with her cardiologist.  Her blood pressure has been in the 130s for the past 2 visits here and with her cardiologist she is not on high blood pressure medicine.  She has a colonoscopy scheduled this summer she takes vitamin D.    OBSTETRICS HISTORY:  OB History    Para Term  AB Living   2 2 2     2   SAB IAB Ectopic Multiple Live Births           2      # Outcome Date GA Lbr [place]/2nd Weight Sex Type Anes PTL Lv   2 Term 08 40w0d   M NORMAL SPONT      1 Term 05 40w0d   F NORMAL SPONT          GYNE HISTORY:  Periods absent    History   Sexual Activity    Sexual activity: Yes    Partners: Male    Birth control/ protection: Pill, OCP     Comment: continously takes pill                  MEDICAL HISTORY:  Past Medical History:    Abnormal ultrasound of breast    ABUS:no sonographic evidence of malignancy in either breast    Allergic rhinitis    Amenorrhea    on birth control    Ankle sprain    Arthritis    Wondering how I can handle discomfort and weakness    Bilateral breast cysts    Ultrasound of both breast negative for malignancy    Body piercing    Breast cancer screening other than mammogram    ABUS negative    Breast cancer screening other than mammogram    Ultrasound: RIGHT BREAST: No suspicious solid or cystic masses are seen. LEFT BREAST: No suspicious solid or cystic masses are seen.    Colon cancer screening    Cologuard = Negative    Decorative tattoo    Exposure to TB    exposure when working in [employer]- took medication for 6 months    Frequent urination    prescribed medication    Genital warts    H/O mammogram    negative     H/O mammogram    1 year screening mammogram is recommended. Given the patient's breast density, screening automated breast ultrasound should be considered as a supplement to the patient's annual mammogram.    Heartburn    It is mild and resolves quickly.  I have not needed to take antacid    Hemorrhoids    after the birth of my daughter    High cholesterol    started statin meds- family hx significant for heart disease    History of biopsy    vulva biopsy - negative for dysplasia,viral effect, and malignancy    History of mammogram    benign-MCAI    Human papilloma virus infection    pap negative    Hypercholesterolemia    Hyperlipidemia    Leaking of urine    Multiple allergies    Pap smear for cervical cancer screening    Parotitis    Rash    eczema    Sexually transmitted disease       SURGICAL HISTORY:  Past Surgical History:   Procedure Laterality Date    Colposcopy, cervix w/upper adjacent vagina; w/biopsy(s), cervix      Labial biopsy - jar(s): 2      normal    Laser surgery of cervix        2005, 2008    Oral surgery  2019    Other surgical history  10/2014    oral implant     [place] teeth removed         SOCIAL HISTORY:  Social History     Socioeconomic History    Marital status:      Spouse name: Not on file    Number of children: Not on file    Years of education: Not on file    Highest education level: Not on file   Occupational History    Not on file   Tobacco Use    Smoking status: Never     Passive exposure: Never    Smokeless tobacco: Never   Vaping Use    Vaping status: Never Used   Substance and Sexual Activity    Alcohol use: Yes     Alcohol/week: 2.0 standard drinks of alcohol     Comment: occasional    Drug use: No    Sexual activity: Yes     Partners: Male     Birth control/protection: Pill, OCP     Comment: continously takes pill     Other Topics Concern     Service Not Asked    Blood Transfusions Not Asked    Caffeine Concern Yes     Comment: no concern, I drink coffee    Occupational Exposure Not Asked    Hobby Hazards Not Asked    Sleep Concern Not Asked    Stress Concern No    Weight Concern Yes     Comment: I would like to lose weight    Special Diet No    Back Care Not Asked    Exercise Yes     Comment: I exercise typically 5-6 days a week    Bike Helmet Not Asked    Seat Belt Yes     Comment: I wear my seatbelt, no question    Self-Exams Not Asked   Social History Narrative    Not on file     Social Determinants of Health     Financial Resource Strain: Not on file   Food Insecurity: Not on file   Transportation Needs: Not on file   Physical Activity: Not on file   Stress: Not on file   Social Connections: Unknown (3/14/2021)    [facility]    Social Connections     Conversations with friends/family/neighbors per week: Not on file   Housing Stability: Low Risk  (7/9/2021)    [facility], [facility]    Housing Stability     Mortgage Payment Concerns?: Not on file     Number of Places Lived in the Last Year: Not on file     Unstable Housing?: Not on file       FAMILY HISTORY:  Family History   Problem Relation Age of Onset    Heart Attack Father 38    Heart Disease Father     Heart Disorder Father         massive heart attack 38    Other (tobacco) Father     Other (osteopenia) Mother     No Known Problems Daughter     No Known Problems Son     No Known Problems Maternal Grandmother     No Known Problems Maternal Grandfather     No Known Problems Paternal Grandmother     No Known Problems Paternal Grandfather     Heart Disorder Brother     Hypertension Brother     Lipids Brother     Other (tobacco) Brother     Prostate Cancer Neg     Breast Cancer Neg     Colon Cancer Neg     Ovarian Cancer Neg     Uterine Cancer Neg      Pancreatic Cancer Neg        MEDICATIONS:    Current Outpatient Medications:     diclofenac (VOLTAREN) 1 % External Gel, Apply 4 g topically every 6 (six) hours as needed., Disp: 1 each, Rfl: 2    mometasone 0.1 % External Ointment, APPLY TO AFFECTED AREAS ON ARMS TWICE JONES. USE FOR 2 WEEKS, Disp: , Rfl:     fluticasone propionate 50 MCG/ACT Nasal Suspension, USE 1 SPRAY IN EACH NOSTRIL ONCE TO TWICE DAILY (PENDING COURSE), Disp: , Rfl:     simvastatin 20 MG Oral Tab, Take 1 tablet (20 mg total) by mouth nightly., Disp: 90 tablet, Rfl: 3    triamcinolone 0.1 % External Cream, Apply 1 Application topically 2 (two) times daily as needed., Disp: 45 g, Rfl: 1    cholecalciferol 125 MCG (5000 UT) Oral Tab, Take 1 tablet (5,000 Units total) by mouth daily., Disp: , Rfl:     ZINC OR, Take 30 mg by mouth as needed (When around others who are sick)., Disp: , Rfl:     tolterodine 2 MG Oral Tab, Take 1 tablet (2 mg total) by mouth daily., Disp: 90 tablet, Rfl: 5    Ascorbic Acid (VITAMIN C OR), Take 360 mg by mouth daily., Disp: , Rfl:     aspirin 81 MG Oral Tab, Take 1 tablet (81 mg total) by mouth daily., Disp: , Rfl:     ALLERGIES:    Allergies   Allergen Reactions    Dicloxacillin HIVES    Pcns [Penicillins] HIVES         Review of Systems:  Constitutional:  Denies fatigue, night sweats, hot flashes  Gastrointestinal:  denies heartburn, abdominal pain, diarrhea or constipation  Genitourinary:  denies dysuria, incontinence, abnormal vaginal discharge, vaginal itching  Skin/Breast:  Denies any breast pain, lumps, or discharge.   Neurological:  denies headaches, extremity weakness or numbness.      PHYSICAL EXAM:   Constitutional: well developed, well nourished  Head/Face: normocephalic  Neck/Thyroid: thyroid symmetric, no thyromegaly, no nodules, no adenopathy  Breast: normal without palpable masses, tenderness, asymmetry, nipple discharge, nipple retraction or skin changes  Abdomen:  soft, nontender, nondistended, no  masses  Skin/Hair: no unusual rashes or bruises   Extremities: no edema, no cyanosis  Psychiatric:  Oriented to time, place, person and situation. Appropriate mood and affect    Pelvic Exam:  External Genitalia: normal appearance, hair distribution, and no lesions  Urethral Meatus:  normal in size, location, without lesions and prolapse  Bladder:  No fullness, masses or tenderness  Vagina:  Normal appearance without lesions, no abnormal discharge  Cervix:  Normal without tenderness on motion without lesions Pap.  Uterus: normal in size, contour, position, mobility, without tenderness  Adnexa: normal without masses or tenderness  Perineum: normal  Anus: no hemorroids     Assessment & Plan:  Connie was seen today for wellness visit.    Diagnoses and all orders for this visit:    Encounter for screening mammogram for breast cancer  -     [facility] LEENA 2D+3D SCREENING BILAT (CPT=77067/88971); Future    Well woman exam with routine gynecological exam        Urgency incontinence.

## 2024-07-31 PROBLEM — D12.0 BENIGN NEOPLASM OF CECUM: Status: ACTIVE | Noted: 2024-07-31

## 2024-07-31 PROBLEM — Z12.11 SPECIAL SCREENING FOR MALIGNANT NEOPLASMS, COLON: Status: ACTIVE | Noted: 2024-07-31

## 2024-08-05 LAB
.: ABNORMAL
.: ABNORMAL
HPV E6+E7 MRNA CVX QL NAA+PROBE: NEGATIVE

## 2024-08-31 ENCOUNTER — LAB ENCOUNTER (OUTPATIENT)
Dept: LAB | Age: 54
End: 2024-08-31
Attending: INTERNAL MEDICINE
Payer: COMMERCIAL

## 2024-08-31 DIAGNOSIS — I25.10 CORONARY ATHEROSCLEROSIS OF NATIVE CORONARY ARTERY: Primary | ICD-10-CM

## 2024-08-31 DIAGNOSIS — E78.00 PURE HYPERCHOLESTEROLEMIA: ICD-10-CM

## 2024-08-31 LAB
ALBUMIN SERPL-MCNC: 4.4 G/DL (ref 3.2–4.8)
ALBUMIN/GLOB SERPL: 1.6 {RATIO} (ref 1–2)
ALP LIVER SERPL-CCNC: 101 U/L
ALT SERPL-CCNC: 13 U/L
ANION GAP SERPL CALC-SCNC: 5 MMOL/L (ref 0–18)
AST SERPL-CCNC: 18 U/L (ref ?–34)
BASOPHILS # BLD AUTO: 0.03 X10(3) UL (ref 0–0.2)
BASOPHILS NFR BLD AUTO: 0.6 %
BILIRUB SERPL-MCNC: 0.4 MG/DL (ref 0.3–1.2)
BUN BLD-MCNC: 12 MG/DL (ref 9–23)
CALCIUM BLD-MCNC: 9.7 MG/DL (ref 8.7–10.4)
CHLORIDE SERPL-SCNC: 108 MMOL/L (ref 98–112)
CHOLEST SERPL-MCNC: 139 MG/DL (ref ?–200)
CO2 SERPL-SCNC: 28 MMOL/L (ref 21–32)
CREAT BLD-MCNC: 0.75 MG/DL
EGFRCR SERPLBLD CKD-EPI 2021: 96 ML/MIN/1.73M2 (ref 60–?)
EOSINOPHIL # BLD AUTO: 0.16 X10(3) UL (ref 0–0.7)
EOSINOPHIL NFR BLD AUTO: 3.4 %
ERYTHROCYTE [DISTWIDTH] IN BLOOD BY AUTOMATED COUNT: 13.3 %
FASTING PATIENT LIPID ANSWER: YES
FASTING STATUS PATIENT QL REPORTED: YES
GLOBULIN PLAS-MCNC: 2.8 G/DL (ref 2–3.5)
GLUCOSE BLD-MCNC: 86 MG/DL (ref 70–99)
HCT VFR BLD AUTO: 40.3 %
HDLC SERPL-MCNC: 49 MG/DL (ref 40–59)
HGB BLD-MCNC: 13.1 G/DL
IMM GRANULOCYTES # BLD AUTO: 0.01 X10(3) UL (ref 0–1)
IMM GRANULOCYTES NFR BLD: 0.2 %
LDLC SERPL CALC-MCNC: 79 MG/DL (ref ?–100)
LYMPHOCYTES # BLD AUTO: 1.38 X10(3) UL (ref 1–4)
LYMPHOCYTES NFR BLD AUTO: 29.2 %
MCH RBC QN AUTO: 28.6 PG (ref 26–34)
MCHC RBC AUTO-ENTMCNC: 32.5 G/DL (ref 31–37)
MCV RBC AUTO: 88 FL
MONOCYTES # BLD AUTO: 0.55 X10(3) UL (ref 0.1–1)
MONOCYTES NFR BLD AUTO: 11.7 %
NEUTROPHILS # BLD AUTO: 2.59 X10 (3) UL (ref 1.5–7.7)
NEUTROPHILS # BLD AUTO: 2.59 X10(3) UL (ref 1.5–7.7)
NEUTROPHILS NFR BLD AUTO: 54.9 %
NONHDLC SERPL-MCNC: 90 MG/DL (ref ?–130)
OSMOLALITY SERPL CALC.SUM OF ELEC: 291 MOSM/KG (ref 275–295)
PLATELET # BLD AUTO: 240 10(3)UL (ref 150–450)
POTASSIUM SERPL-SCNC: 4.2 MMOL/L (ref 3.5–5.1)
PROT SERPL-MCNC: 7.2 G/DL (ref 5.7–8.2)
RBC # BLD AUTO: 4.58 X10(6)UL
SODIUM SERPL-SCNC: 141 MMOL/L (ref 136–145)
TRIGL SERPL-MCNC: 50 MG/DL (ref 30–149)
VLDLC SERPL CALC-MCNC: 8 MG/DL (ref 0–30)
WBC # BLD AUTO: 4.7 X10(3) UL (ref 4–11)

## 2024-08-31 PROCEDURE — 85025 COMPLETE CBC W/AUTO DIFF WBC: CPT

## 2024-08-31 PROCEDURE — 80053 COMPREHEN METABOLIC PANEL: CPT

## 2024-08-31 PROCEDURE — 80061 LIPID PANEL: CPT

## 2024-08-31 PROCEDURE — 36415 COLL VENOUS BLD VENIPUNCTURE: CPT

## 2024-09-17 ENCOUNTER — TELEPHONE (OUTPATIENT)
Dept: INTERNAL MEDICINE CLINIC | Facility: CLINIC | Age: 54
End: 2024-09-17

## 2024-09-17 NOTE — TELEPHONE ENCOUNTER
Rec exercise treadmill testing report from [facility]. Placed in provider bin for review. Thanks!

## 2025-03-13 ENCOUNTER — TELEPHONE (OUTPATIENT)
Facility: CLINIC | Age: 55
End: 2025-03-13

## 2025-03-13 DIAGNOSIS — R92.333 HETEROGENEOUSLY DENSE TISSUE OF BOTH BREASTS ON MAMMOGRAPHY: ICD-10-CM

## 2025-03-13 DIAGNOSIS — Z12.39 SCREENING BREAST EXAMINATION: Primary | ICD-10-CM

## 2025-03-13 NOTE — TELEPHONE ENCOUNTER
Spoke with Hortencia at [facility]. Patient is scheduled for mammogram on 3/15/25. They are stating they need order for bilateral breast ultrasound for dense breasts.     CPT - 42813  DX - Z12.39 and R92.333    Pended order. Routed to Dr. Oconnell for review.     If approved, please fax to [phone]

## 2025-03-17 ENCOUNTER — TELEPHONE (OUTPATIENT)
Dept: INTERNAL MEDICINE CLINIC | Facility: CLINIC | Age: 55
End: 2025-03-17

## 2025-03-17 NOTE — TELEPHONE ENCOUNTER
Incoming (mail or fax):  fax   Received from:  [place]   Documentation given to:  results     Mammo, US

## 2025-04-11 DIAGNOSIS — E78.00 HYPERCHOLESTEROLEMIA: ICD-10-CM

## 2025-04-11 RX ORDER — SIMVASTATIN 20 MG
20 TABLET ORAL NIGHTLY
Qty: 90 TABLET | Refills: 0 | Status: SHIPPED | OUTPATIENT
Start: 2025-04-11

## 2025-04-11 NOTE — TELEPHONE ENCOUNTER
Cholesterol Medication Protocol [id number]/11/2025 12:25 AM   Protocol Details ALT < 80    ALT resulted within past year    Lipid panel within past 12 months    In person appointment or virtual visit in the past 12 mos or appointment in next 3 mos    Medication is active on med list      (HCC)  4. Hypercholesterolemia  - continue statin and asa  - low fat diet and exercise  Future Appointments   Date Time Provider Department Center   4/21/2025  4:40 PM Faith Costa APRN [facility]   7/30/2025  9:00 AM Clarissa Oconnell MD [facility] [place]

## 2025-04-21 ENCOUNTER — OFFICE VISIT (OUTPATIENT)
Dept: INTERNAL MEDICINE CLINIC | Facility: CLINIC | Age: 55
End: 2025-04-21
Payer: COMMERCIAL

## 2025-04-21 VITALS
OXYGEN SATURATION: 98 % | HEIGHT: 59 IN | TEMPERATURE: 97 F | BODY MASS INDEX: 28.2 KG/M2 | HEART RATE: 88 BPM | WEIGHT: 139.88 LBS | RESPIRATION RATE: 16 BRPM | DIASTOLIC BLOOD PRESSURE: 60 MMHG | SYSTOLIC BLOOD PRESSURE: 110 MMHG

## 2025-04-21 DIAGNOSIS — Z00.00 ENCOUNTER FOR ANNUAL PHYSICAL EXAM: Primary | ICD-10-CM

## 2025-04-21 DIAGNOSIS — I25.10 CORONARY ARTERY DISEASE INVOLVING NATIVE CORONARY ARTERY OF NATIVE HEART WITHOUT ANGINA PECTORIS: ICD-10-CM

## 2025-04-21 DIAGNOSIS — R21 RASH AND NONSPECIFIC SKIN ERUPTION: ICD-10-CM

## 2025-04-21 DIAGNOSIS — H61.23 BILATERAL IMPACTED CERUMEN: ICD-10-CM

## 2025-04-21 DIAGNOSIS — I77.9 BILATERAL CAROTID ARTERY DISEASE, UNSPECIFIED TYPE: ICD-10-CM

## 2025-04-21 DIAGNOSIS — E78.00 HYPERCHOLESTEROLEMIA: ICD-10-CM

## 2025-04-21 RX ORDER — SIMVASTATIN 20 MG
20 TABLET ORAL NIGHTLY
Qty: 90 TABLET | Refills: 3 | Status: SHIPPED | OUTPATIENT
Start: 2025-04-21

## 2025-04-21 RX ORDER — MOMETASONE FUROATE 1 MG/G
1 OINTMENT TOPICAL DAILY
Qty: 45 G | Refills: 0 | Status: SHIPPED | OUTPATIENT
Start: 2025-04-21

## 2025-04-21 NOTE — PROGRESS NOTES
CHIEF COMPLAINT   Complete physical, med check    HPI:   Connie Bridges is a 54 year old female who presents for a complete physical exam, med check    Wt Readings from Last 6 Encounters:   04/21/25 139 lb 14.4 oz (63.5 kg)   07/29/24 155 lb (70.3 kg)   07/18/24 153 lb (69.4 kg)   04/26/24 153 lb (69.4 kg)   04/15/24 155 lb 1.6 oz (70.4 kg)   11/21/23 154 lb 3.2 oz (69.9 kg)     Body mass index is 28.26 kg/m².     Diet is fair and exercise is good. Vaccines reviewed. Wearing seat belt and no texting and driving. Feels safe at home. Sees gyne. Pap UTD. Mammo UTD. Is not always doing self breast exams. Colon cancer screening UTD. No smoking. Occasional alcohol. No skin concerns. Sees derm. Labs reviewed.     HLD/carotid disease/CAD- on statin and baby asa. No SE. Low fat diet and exercise. Needs repeat scans.     Rash- to bilateral elbows. Is itchy. Needs a cream.     Cerumen impaction- no hearing loss      Cholesterol, Total (mg/dL)   Date Value   10/30/2024 151   08/31/2024 139   10/25/2023 151   10/26/2022 167   10/26/2022 167     CHOLESTEROL, TOTAL (mg/dL)   Date Value   12/29/2015 177   01/03/2015 190   11/21/2012 187     CHOLESTEROL (mg/dL)   Date Value   12/21/2013 163     HDL Cholesterol (mg/dL)   Date Value   10/30/2024 51   08/31/2024 49   10/25/2023 52   10/26/2022 52   10/26/2022 52     HDL CHOLESTEROL (mg/dL)   Date Value   12/29/2015 58   01/03/2015 62   11/21/2012 50     HDL CHOL (mg/dL)   Date Value   12/21/2013 46     LDL Cholesterol (mg/dL)   Date Value   08/31/2024 79   01/25/2020 105 (H)   12/29/2018 129 (H)     LDL-CHOLESTEROL (mg/dL (calc))   Date Value   12/29/2015 103   01/03/2015 114   11/21/2012 123     LDL CHOLESTROL (mg/dL)   Date Value   12/21/2013 104     AST (U/L)   Date Value   10/30/2024 17   08/31/2024 18   10/25/2023 20   10/26/2022 15   10/26/2022 15   01/25/2020 18   12/29/2018 17   12/29/2015 18   01/03/2015 15   12/21/2013 20   11/21/2012 19     ALT (U/L)   Date Value    10/30/2024 13   08/31/2024 13   10/25/2023 21   10/26/2022 16   10/26/2022 16   01/25/2020 23   12/29/2018 22   12/29/2015 15   01/03/2015 15   12/21/2013 32   11/21/2012 18        Current Outpatient Medications   Medication Sig Dispense Refill    Omega-3 Fatty Acids (FISH OIL OR) Take by mouth.      simvastatin 20 MG Oral Tab TAKE 1 TABLET BY MOUTH EVERY DAY AT NIGHT 90 tablet 0    tolterodine 2 MG Oral Tab Take 1 tablet (2 mg total) by mouth daily. 90 tablet 5    diclofenac (VOLTAREN) 1 % External Gel Apply 4 g topically every 6 (six) hours as needed. 1 each 2    mometasone 0.1 % External Ointment APPLY TO AFFECTED AREAS ON ARMS TWICE JONES. USE FOR 2 WEEKS      fluticasone propionate 50 MCG/ACT Nasal Suspension USE 1 SPRAY IN EACH NOSTRIL ONCE TO TWICE DAILY (PENDING COURSE)      triamcinolone 0.1 % External Cream Apply 1 Application topically 2 (two) times daily as needed. 45 g 1    cholecalciferol 125 MCG (5000 UT) Oral Tab Take 1 tablet (5,000 Units total) by mouth daily.      ZINC OR Take 30 mg by mouth as needed (When around others who are sick).      Ascorbic Acid (VITAMIN C OR) Take 360 mg by mouth daily.      aspirin 81 MG Oral Tab Take 1 tablet (81 mg total) by mouth daily.        Allergies   Allergen Reactions    Dicloxacillin HIVES    Pcns [Penicillins] HIVES      Past Medical History:    Abnormal ultrasound of breast    ABUS:no sonographic evidence of malignancy in either breast    Allergic rhinitis    Amenorrhea    on birth control    Ankle sprain    Arthritis    Wondering how I can handle discomfort and weakness    Bilateral breast cysts    Ultrasound of both breast negative for malignancy    Body piercing    Breast cancer screening by mammogram    No mammographic or sonographic evidence of malignancy.   RECOMMENDATION: A 1 year screening mammogram is recommended. Given the patient's breast density, screening automated breast ultrasound should be considered as a supplement to the patient's annual  mammogram.    Breast cancer screening other than mammogram    ABUS negative    Breast cancer screening other than mammogram    Ultrasound: RIGHT BREAST: No suspicious solid or cystic masses are seen. LEFT BREAST: No suspicious solid or cystic masses are seen.    Breast cancer screening other than mammogram    No mammographic or sonographic evidence of malignancy.   RECOMMENDATION: A 1 year screening mammogram is recommended. Given the patient's breast density, screening automated breast ultrasound should be considered as a supplement to the patient's annual mammogram.    Colon cancer screening    Cologuard = Negative    Decorative tattoo    Exposure to TB    exposure when working in [employer] correction- took medication for 6 months    Frequent urination    prescribed medication    Genital warts    H/O mammogram    negative     H/O mammogram    1 year screening mammogram is recommended. Given the patient's breast density, screening automated breast ultrasound should be considered as a supplement to the patient's annual mammogram.    Heartburn    It is mild and resolves quickly.  I have not needed to take antacid    Hemorrhoids    after the birth of my daughter    High cholesterol    started statin meds- family hx significant for heart disease    History of biopsy    vulva biopsy - negative for dysplasia,viral effect, and malignancy    History of mammogram    benign-MCAI    Human papilloma virus infection    pap negative    Hypercholesterolemia    Hyperlipidemia    started medication that year    Leaking of urine    Multiple allergies    Pap smear for cervical cancer screening    Parotitis    Rash    eczema    Sexually transmitted disease      Past Surgical History:   Procedure Laterality Date    Colposcopy, cervix w/upper adjacent vagina; w/biopsy(s), cervix      Labial biopsy - jar(s): 2      normal    Laser surgery of cervix        2005, 2008    Oral surgery  2019    Other surgical history   10/2014    oral implant     [place] teeth removed        Family History   Problem Relation Age of Onset    Heart Attack Father 38    Heart Disease Father     Heart Disorder Father         massive heart attack 38    Other (tobacco) Father     Other (osteopenia) Mother     No Known Problems Daughter     No Known Problems Son     No Known Problems Maternal Grandmother     No Known Problems Maternal Grandfather     No Known Problems Paternal Grandmother     No Known Problems Paternal Grandfather     Heart Disorder Brother     Hypertension Brother     Lipids Brother     Other (tobacco) Brother     Heart Disease Brother     Heart Disorder Brother         Quadruple bipass at the age of 40    Prostate Cancer Neg     Breast Cancer Neg     Colon Cancer Neg     Ovarian Cancer Neg     Uterine Cancer Neg     Pancreatic Cancer Neg       Social History:   Social History     Socioeconomic History    Marital status:    Tobacco Use    Smoking status: Never     Passive exposure: Never    Smokeless tobacco: Never   Vaping Use    Vaping status: Never Used   Substance and Sexual Activity    Alcohol use: Yes     Alcohol/week: 2.0 standard drinks of alcohol     Comment: occasional    Drug use: No    Sexual activity: Yes     Partners: Male     Birth control/protection: Pill, OCP     Comment: continously takes pill    Other Topics Concern    Caffeine Concern Yes     Comment: no concern, I drink coffee    Stress Concern No    Weight Concern Yes     Comment: I would like to lose weight    Special Diet No    Exercise Yes     Comment: I exercise typically 5-6 days a week    Seat Belt Yes     Comment: I wear my seatbelt, no question     Social Drivers of Health      Received from [facility]    Housing Stability        REVIEW OF SYSTEMS:   GENERAL: feels well otherwise  SKIN: no complaint of any unusual skin lesions  EYES: no complaint of blurred vision or double vision  HEENT: no complaint of nasal congestion, sinus pain  or ST  LUNGS: no complaint of shortness of breath with exertion  CARDIOVASCULAR: no complaint of chest pain on exertion  GI: no complaint of pain, denies heartburn  : no complaints of vaginal discharge or urinary complaints  MUSCULOSKELETAL: no complaint of back pain  NEURO: no complaint of headaches  PSYCHE: no complaint of depression or anxiety  HEMATOLOGIC: denies hx of anemia    EXAM:   /60 (BP Location: Left arm, Patient Position: Sitting, Cuff Size: adult)   Pulse 88   Temp 97.1 °F (36.2 °C) (Temporal)   Resp 16   Ht 4' 11\" (1.499 m)   Wt 139 lb 14.4 oz (63.5 kg)   LMP 02/16/2024 (Exact Date)   SpO2 98%   BMI 28.26 kg/m²   Body mass index is 28.26 kg/m².   GENERAL: well developed, well nourished,in no apparent distress  SKIN: no skin lesions  HEENT: atraumatic, normocephalic, ears are with cerumen bilaterally- irrigated and Tms clear  EYES:PERRLA, EOMI, conjunctiva are clear  NECK: supple,no adenopathy   BREAST:DEFERRED TO GYNE  LUNGS: clear to auscultation; no rhonchi, rales, or wheezing  CARDIO: RRR without murmur  GI:  no tenderness or masses   :DEFERRED TO GYNE   MUSCULOSKELETAL: No obvious joint deformity or swelling.  Normal gait.  EXTREMITIES: no edema or calve tenderness   NEURO: Oriented times three,cranial nerves are grossly intact,motor and sensory are grossly intact    LABS:     Lab Results   Component Value Date    WBC 5.2 10/30/2024    RBC 4.58 08/31/2024    HGB 12.1 10/30/2024    HCT 39.2 10/30/2024    MCV 91 10/30/2024    MCH 28.6 08/31/2024    MCHC 32.5 08/31/2024    RDW 13.3 08/31/2024     10/30/2024      Lab Results   Component Value Date    GLU 87 10/30/2024    BUN 15 10/30/2024    BUNCREA 17.2 01/25/2020    CREATSERUM 0.75 10/30/2024    ANIONGAP 5 08/31/2024    GFR 92 11/02/2020    GFRNAA 96 10/30/2024    GFRAA 92 01/25/2020    CA 9.5 10/30/2024    OSMOCALC 291 08/31/2024    ALKPHO 101 08/31/2024    AST 17 10/30/2024    ALT 13 10/30/2024    BILT 0.3 10/30/2024     TP 6.8 10/30/2024    ALB 4.4 08/31/2024    GLOBULIN 2.8 08/31/2024    AGRATIO 1.4 12/29/2015     08/31/2024    K 4.3 10/30/2024     10/30/2024    CO2 28.0 08/31/2024      Lab Results   Component Value Date    CHOLEST 151 10/30/2024    TRIG 71 10/30/2024    HDL 51 10/30/2024    LDL 79 08/31/2024    VLDL 8 08/31/2024    TCHDLRATIO 3.20 10/26/2022    NONHDLC 100 10/30/2024      Lab Results   Component Value Date    T4F 1.3 01/25/2018    TSH 2.000 02/06/2023    TSHT4 1.94 12/29/2015      Lab Results   Component Value Date     01/25/2020    A1C 5.2 01/25/2020         ASSESSMENT AND PLAN:   1. Encounter for annual physical exam  - Connie Bridges is a 52 year old female who presents for a complete physical exam.  Pap UTD and pelvic deferred to gyne. Mammo to be ordered by gyne. Encouraged self breast exams. Reviewed diet and exercise.   Pt' s weight is Body mass index is 32.06 kg/m². Recommended regular exercise. Labs reviewed. Vaccines reviewed. Colon screening UTD. Sees derm.     2. Coronary artery disease involving native coronary artery of native heart without angina pectoris  3. Bilateral carotid artery disease, unspecified type (HCC)  4. Hypercholesterolemia  - continue statin and asa  - low fat diet and exercise  - continue to see cardiology  - carotid ultrasound ordered  - get heart scan  - simvastatin 20 MG Oral Tab; Take 1 tablet (20 mg total) by mouth nightly.  Dispense: 90 tablet; Refill: 3    5. Rash and nonspecific skin eruption  - start steroid cream and monitor closely  - mometasone 0.1 % External Ointment; Apply 1 Application topically daily.  Dispense: 45 g; Refill: 0    6. Bilateral impacted cerumen  - irrigated and Tms clear     Follow up in 1 year or sooner as needed  The patient indicates understanding of these issues and agrees to the plan.

## 2025-04-21 NOTE — PATIENT INSTRUCTIONS
Prevnar 20 vaccine recommended    Flu shot recommended.  Mid October is the right time     Get your labs done with work. You should be fasting for at least 10 hours. If you take a multivitamin with Biotin or any biotin product it should be held for 3 days prior to getting your labs done. Try to add thyroid - TSH to annual labs with work     Continue healthy diet and exercise    Continue to see cardiology    Continue your current medications    Continue to see gyne and dermatology    Continue your current medication    Get the heart scan    Get the carotid ultrasound     Follow up with Dr. Richardson in a year or sooner as needed

## 2025-04-24 ENCOUNTER — ORDER TRANSCRIPTION (OUTPATIENT)
Dept: ADMINISTRATIVE | Facility: HOSPITAL | Age: 55
End: 2025-04-24

## 2025-04-24 DIAGNOSIS — Z13.6 SCREENING FOR HEART DISEASE: Primary | ICD-10-CM

## 2025-05-03 ENCOUNTER — HOSPITAL ENCOUNTER (OUTPATIENT)
Dept: CT IMAGING | Age: 55
Discharge: HOME | End: 2025-05-03
Attending: NURSE PRACTITIONER

## 2025-05-03 DIAGNOSIS — Z13.6 SCREENING FOR HEART DISEASE: ICD-10-CM

## 2025-05-17 ENCOUNTER — HOSPITAL ENCOUNTER (OUTPATIENT)
Dept: ULTRASOUND IMAGING | Age: 55
Discharge: HOME | End: 2025-05-17
Attending: NURSE PRACTITIONER
Payer: COMMERCIAL

## 2025-05-17 DIAGNOSIS — I77.9 BILATERAL CAROTID ARTERY DISEASE, UNSPECIFIED TYPE: ICD-10-CM

## 2025-05-17 PROCEDURE — 93880 EXTRACRANIAL BILAT STUDY: CPT | Performed by: NURSE PRACTITIONER

## 2025-05-19 PROBLEM — I77.9 CAROTID DISEASE, BILATERAL: Status: RESOLVED | Noted: 2023-02-06 | Resolved: 2025-05-19

## 2025-05-28 ENCOUNTER — MED REC SCAN ONLY (OUTPATIENT)
Dept: INTERNAL MEDICINE CLINIC | Facility: CLINIC | Age: 55
End: 2025-05-28

## 2025-07-31 ENCOUNTER — OFFICE VISIT (OUTPATIENT)
Facility: CLINIC | Age: 55
End: 2025-07-31
Payer: COMMERCIAL

## 2025-07-31 VITALS
SYSTOLIC BLOOD PRESSURE: 118 MMHG | BODY MASS INDEX: 28.15 KG/M2 | HEIGHT: 59 IN | DIASTOLIC BLOOD PRESSURE: 62 MMHG | HEART RATE: 68 BPM | WEIGHT: 139.63 LBS

## 2025-07-31 DIAGNOSIS — N39.41 URGENCY INCONTINENCE: ICD-10-CM

## 2025-07-31 DIAGNOSIS — Z12.4 PAPANICOLAOU SMEAR FOR CERVICAL CANCER SCREENING: ICD-10-CM

## 2025-07-31 DIAGNOSIS — Z01.419 WELL WOMAN EXAM WITH ROUTINE GYNECOLOGICAL EXAM: Primary | ICD-10-CM

## 2025-07-31 DIAGNOSIS — Z12.31 ENCOUNTER FOR SCREENING MAMMOGRAM FOR BREAST CANCER: ICD-10-CM

## 2025-07-31 DIAGNOSIS — Z78.0 MENOPAUSE: ICD-10-CM

## 2025-07-31 PROCEDURE — 3008F BODY MASS INDEX DOCD: CPT | Performed by: OBSTETRICS & GYNECOLOGY

## 2025-07-31 PROCEDURE — 88175 CYTOPATH C/V AUTO FLUID REDO: CPT | Performed by: OBSTETRICS & GYNECOLOGY

## 2025-07-31 PROCEDURE — 3078F DIAST BP <80 MM HG: CPT | Performed by: OBSTETRICS & GYNECOLOGY

## 2025-07-31 PROCEDURE — 99396 PREV VISIT EST AGE 40-64: CPT | Performed by: OBSTETRICS & GYNECOLOGY

## 2025-07-31 PROCEDURE — 3074F SYST BP LT 130 MM HG: CPT | Performed by: OBSTETRICS & GYNECOLOGY

## 2025-08-04 ENCOUNTER — RESULTS FOLLOW-UP (OUTPATIENT)
Facility: CLINIC | Age: 55
End: 2025-08-04

## (undated) DIAGNOSIS — R92.2 BREAST DENSITY: ICD-10-CM

## (undated) DIAGNOSIS — Z12.39 BREAST CANCER SCREENING OTHER THAN MAMMOGRAM: Primary | ICD-10-CM

## (undated) NOTE — MR AVS SNAPSHOT
[address] [address] [phone]               Thank you for choosing us for your health care visit with Ebenezer Trujillo DO.   We are glad to serve you and happy to provide you with th These medications were sent to [facility] [address] [facility], [address] [facility]. [phone], [address] [facility]     Phone:  [phone]    - levofloxacin 500 MG Tabs            MyChart     Visit [facility]

## (undated) NOTE — Clinical Note
I saw [facility] in the [employer] in [facility] today for contact dermatitis from plant,  she was treated with Medrol dose pack and refill of Triamcinolone cream given. [facility] will follow up with you if no better or as needed.  Thank you for the opportunity to ca

## (undated) NOTE — LETTER
03/09/21        [place] [id number]      Dear Kaylee,    This is a friendly reminder to let you know you have outstanding lab work as listed below.   To schedule an appointment to complete your labs, please go to [facility]

## (undated) NOTE — MR AVS SNAPSHOT
[address] [address] [phone]               Thank you for choosing us for your health care visit with Keri Ascencio DO.   We are glad to serve you and happy to provide you with th Bring a paper prescription for each of these medications    - Albuterol Sulfate  (90 BASE) MCG/ACT Aers  - hydrocodone-homatropine 5-1.5 MG/5ML Syrp            MyChart     Visit MyChart  You can access your MyChart to more actively manage your heal

## (undated) NOTE — LETTER
01/18/18        [address]  [address] [facility] [address]    8/12/1970     Dear Jonnie Rice,    [address] records indicate that you have outstanding lab work and or testing that was ordered for you and has not yet been completed:        Lipid Panel